# Patient Record
Sex: MALE | Race: WHITE | Employment: UNEMPLOYED | URBAN - METROPOLITAN AREA
[De-identification: names, ages, dates, MRNs, and addresses within clinical notes are randomized per-mention and may not be internally consistent; named-entity substitution may affect disease eponyms.]

---

## 2021-06-25 ENCOUNTER — HOSPITAL ENCOUNTER (EMERGENCY)
Age: 26
Discharge: HOME | End: 2021-06-25
Payer: COMMERCIAL

## 2021-06-25 VITALS
OXYGEN SATURATION: 98 % | HEART RATE: 66 BPM | SYSTOLIC BLOOD PRESSURE: 110 MMHG | DIASTOLIC BLOOD PRESSURE: 66 MMHG | HEIGHT: 73 IN | BODY MASS INDEX: 21.87 KG/M2 | RESPIRATION RATE: 16 BRPM | TEMPERATURE: 97.5 F | WEIGHT: 165 LBS

## 2021-06-25 DIAGNOSIS — T30.0 BURN: Primary | ICD-10-CM

## 2021-06-25 DIAGNOSIS — L08.89 SECONDARY INFECTION OF SKIN: ICD-10-CM

## 2021-06-25 PROCEDURE — 99283 EMERGENCY DEPT VISIT LOW MDM: CPT

## 2021-06-25 RX ORDER — CEPHALEXIN 500 MG/1
500 CAPSULE ORAL 4 TIMES DAILY
Qty: 40 CAPSULE | Refills: 0 | Status: SHIPPED | OUTPATIENT
Start: 2021-06-25 | End: 2021-06-25 | Stop reason: SDUPTHER

## 2021-06-25 RX ORDER — CEPHALEXIN 500 MG/1
500 CAPSULE ORAL ONCE
Status: DISCONTINUED | OUTPATIENT
Start: 2021-06-25 | End: 2021-06-25 | Stop reason: HOSPADM

## 2021-06-25 RX ORDER — CEPHALEXIN 500 MG/1
500 CAPSULE ORAL 4 TIMES DAILY
Qty: 40 CAPSULE | Refills: 0 | Status: SHIPPED | OUTPATIENT
Start: 2021-06-25 | End: 2021-07-05

## 2021-06-25 ASSESSMENT — ENCOUNTER SYMPTOMS
COLOR CHANGE: 0
ABDOMINAL PAIN: 0
SORE THROAT: 0
SHORTNESS OF BREATH: 0
RHINORRHEA: 0

## 2021-06-25 ASSESSMENT — PAIN SCALES - GENERAL
PAINLEVEL_OUTOF10: 5
PAINLEVEL_OUTOF10: 7

## 2021-06-25 NOTE — ED PROVIDER NOTES
Evaluated by [id number] [facility] Provider          [address] ED  EMERGENCY DEPARTMENT ENCOUNTER        Pt Name: Kermit Thomson  MRN: [id number]  [place] 1995  Dateof evaluation: 6/25/2021  Provider: ANA Najera - CNP  PCP: Marium Downing MD  ED Attending: No att. providers found    [facility]       Chief Complaint   Patient presents with    Burn     small dime sized burn with open broken blister 1days old middle finger over mid pip. No redness or swelling. HISTORY OF PRESENTILLNESS   (Location/Symptom, Timing/Onset, Context/Setting, Quality, Duration, Modifying Factors, Severity)  Note limiting factors. Kermit Thomson is a 27 y.o. male for burn. Onset was 3 days ago. Context includes patient states that he had a burn to his left middle finger 3 days ago from grease. Patient states there was a blister initially that broke. Patient states that there is now increased redness and occasional discharge. He denies any fever. Patient states his tetanus is up-to-date. He is right-hand dominant. Alleviating factors include nothing. Aggravating factors include nothing. Pain is 7/10. Nothing has been used for pain today. Nursing Notes were all reviewed and agreed with or any disagreements were addressed  in the HPI. REVIEW OF SYSTEMS    (2-9 systems for level 4, 10 or more for level 5)     Review of Systems   Constitutional: Negative for fever. HENT: Negative for congestion, rhinorrhea and sore throat. Respiratory: Negative for shortness of breath. Cardiovascular: Negative for chest pain. Gastrointestinal: Negative for abdominal pain. Genitourinary: Negative for decreased urine volume and difficulty urinating. Musculoskeletal: Negative for arthralgias and myalgias. Skin: Positive for wound. Negative for color change and rash. Neurological: Negative for dizziness and light-headedness. Psychiatric/Behavioral: Negative for agitation.    All other systems reviewed and are negative. Positives and Pertinent negatives as per HPI. Except as noted above in the ROS, all other systems were reviewed and negative. PAST MEDICAL HISTORY     Past Medical History:   Diagnosis Date    Asthma     Hepatitis B surface antigen positive 5/11/18, 5/1/18, 12/21/2017    Hepatitis C antibody positive in blood 5/11/18, 5/4/18, 12/21/2017         SURGICAL HISTORY       Past Surgical History:   Procedure Laterality Date    MOUTH SURGERY           CURRENT MEDICATIONS       Current Discharge Medication List            ALLERGIES     Patient has no known allergies. FAMILY HISTORY     No family history on file. SOCIAL HISTORY       Social History     Socioeconomic History    Marital status: Single     Spouse name: Not on file    Number of children: Not on file    Years of education: Not on file    Highest education level: Not on file   Occupational History    Not on file   Tobacco Use    Smoking status: Former Smoker     Packs/day: 0.50     Years: 5.00     Pack years: 2.50     Types: Cigarettes    Smokeless tobacco: Never Used   Substance and Sexual Activity    Alcohol use: No     Comment: occasional    Drug use: No     Types: Marijuana     Comment: Clean x 2 weeks    Sexual activity: Yes     Partners: Female   Other Topics Concern    Not on file   Social History Narrative    Not on file     Social Determinants of Health     Financial Resource Strain:     Difficulty of Paying Living Expenses:    Food Insecurity:     Worried About Running Out of Food in the Last Year:     Ran Out of Food in the Last Year:    Transportation Needs:     Lack of Transportation (Medical):      Lack of Transportation (Non-Medical):    Physical Activity:     Days of Exercise per Week:     Minutes of Exercise per Session:    Stress:     Feeling of Stress :    Social Connections:     Frequency of Communication with Friends and Family:     Frequency of Social Gatherings with Friends and Family:     Attends Bahai Services:     Active Member of Clubs or Organizations:     Attends Club or Organization Meetings:     Marital Status:    Intimate Partner Violence:     Fear of Current or Ex-Partner:     Emotionally Abused:     Physically Abused:     Sexually Abused:        SCREENINGS             PHYSICAL EXAM  (up to 7 for level 4, 8 or more for level 5)     ED Triage Vitals [06/25/21 1812]   BP Temp Temp Source Pulse Resp SpO2 Height Weight   (!) 105/57 97.5 °F (36.4 °C) Oral 67 18 98 % 6' 1\" (1.854 m) 165 lb (74.8 kg)       Physical Exam  Constitutional:       Appearance: He is well-developed. HENT:      Head: Normocephalic and atraumatic. Cardiovascular:      Rate and Rhythm: Normal rate. Pulmonary:      Effort: Pulmonary effort is normal. No respiratory distress. Abdominal:      General: There is no distension. Palpations: Abdomen is soft. Tenderness: There is no abdominal tenderness. Musculoskeletal:         General: Tenderness and signs of injury present. No swelling or deformity. Cervical back: Normal range of motion. Comments: Decreased range of motion to left middle finger at the PIP joint due to pain with movement. 0.5 cm diameter open wound noted with surrounding erythema. No concerns for flexor tenosynovitis no swelling noted. Patient is right-hand dominant. Sensation strength and pulse intact left hand. Skin:     General: Skin is warm and dry. Neurological:      Mental Status: He is alert and oriented to person, place, and time. DIAGNOSTIC RESULTS   LABS:    Labs Reviewed - No data to display    All other labs werewithin normal range or not returned as of this dictation. EKG: All EKG's are interpreted by the Emergency Department Physician who either signs or Co-signs this chart in the absence of a cardiologist.  Please see their note for interpretation of EKG.       RADIOLOGY:           Interpretation per the Radiologist below, if available at the time of this note:    No orders to display     No results found. PROCEDURES   Unless otherwise noted below, none     Procedures     CRITICAL CARE TIME   N/A    CONSULTS:  None      EMERGENCYDEPARTMENT COURSE and DIFFERENTIAL DIAGNOSIS/MDM:   Vitals:    Vitals:    06/25/21 1812   BP: (!) 105/57   Pulse: 67   Resp: 18   Temp: 97.5 °F (36.4 °C)   TempSrc: Oral   SpO2: 98%   Weight: 165 lb (74.8 kg)   Height: 6' 1\" (1.854 m)       Patient was given the following medications:  Medications   cephALEXin (KEFLEX) capsule 500 mg (has no administration in time range)       Patient was seen today by myself for concerns for a burn. Patient states that he had a grease burn 2 days ago on his left middle finger. Patient states it was a blister that has now broken open and there is an redness. Patient denies any fever but states his tetanus is up-to-date. He is right-hand dominant. On exam he is awake and alert hemodynamically stable nontoxic in appearance. Patient was provided with wound care and Keflex in the ED. Patient will be discharged home with prescription for Keflex and instructions to follow-up with his primary care doctor in the next few days and return to the ED for any worsening symptoms. Patient was also given a referral to 's burn since it is over a joint. Patient was ultimately discharged home with all questions answered. The patient tolerated their visit well. I have evaluated this patient. My supervising physician was available for consultation. The patient and / or the family were informed of the results of any tests, a time was given to answer questions, a plan was proposed and they agreed with plan. FINAL IMPRESSION      1. Burn    2.  Secondary infection of skin          DISPOSITION/PLAN   DISPOSITION Decision To Discharge 06/25/2021 06:22:59 PM      PATIENT REFERRED TO:  Noelle Campuzano MD  [address] [address]  [phone]    Schedule an appointment as soon as possible for a visit in 2 days  for re-evaluation    [facility]  [address] [facility]  [phone]    If symptoms worsen     burn clinic  [facility]  [phone]  Call in 2 days  for re-evaluation      DISCHARGE MEDICATIONS:  Current Discharge Medication List      START taking these medications    Details   cephALEXin (KEFLEX) 500 MG capsule Take 1 capsule by mouth 4 times daily for 10 days  Qty: 40 capsule, Refills: 0             DISCONTINUED MEDICATIONS:  Current Discharge Medication List                 (Please note that portions of this note were completed with a voice recognition program.  Efforts were made to edit the dictations but occasionally words are mis-transcribed.)    ANA Ceh CNP (electronically signed)         ANA Che CNP  06/25/21 9603

## 2021-07-13 ENCOUNTER — APPOINTMENT (OUTPATIENT)
Dept: GENERAL RADIOLOGY | Age: 26
End: 2021-07-13
Payer: COMMERCIAL

## 2021-07-13 ENCOUNTER — HOSPITAL ENCOUNTER (EMERGENCY)
Age: 26
Discharge: LEFT BEFORE DISCHARGE | End: 2021-07-13
Attending: EMERGENCY MEDICINE
Payer: COMMERCIAL

## 2021-07-13 VITALS
DIASTOLIC BLOOD PRESSURE: 73 MMHG | OXYGEN SATURATION: 98 % | BODY MASS INDEX: 20.54 KG/M2 | SYSTOLIC BLOOD PRESSURE: 124 MMHG | TEMPERATURE: 98.2 F | HEIGHT: 73 IN | HEART RATE: 65 BPM | RESPIRATION RATE: 16 BRPM | WEIGHT: 155 LBS

## 2021-07-13 DIAGNOSIS — F19.10 POLYSUBSTANCE ABUSE (HCC): ICD-10-CM

## 2021-07-13 DIAGNOSIS — R55 NEAR SYNCOPE: Primary | ICD-10-CM

## 2021-07-13 LAB
A/G RATIO: 1.6 (ref 1.1–2.2)
ALBUMIN SERPL-MCNC: 4.5 G/DL (ref 3.4–5)
ALP BLD-CCNC: 85 U/L (ref 40–129)
ALT SERPL-CCNC: 22 U/L (ref 10–40)
AMPHETAMINE SCREEN, URINE: POSITIVE
ANION GAP SERPL CALCULATED.3IONS-SCNC: 15 MMOL/L (ref 3–16)
AST SERPL-CCNC: 30 U/L (ref 15–37)
BARBITURATE SCREEN URINE: ABNORMAL
BASE EXCESS VENOUS: 0.2 MMOL/L (ref -3–3)
BASOPHILS ABSOLUTE: 0.2 K/UL (ref 0–0.2)
BASOPHILS RELATIVE PERCENT: 4.2 %
BENZODIAZEPINE SCREEN, URINE: POSITIVE
BILIRUB SERPL-MCNC: 0.5 MG/DL (ref 0–1)
BILIRUBIN URINE: NEGATIVE
BLOOD, URINE: NEGATIVE
BUN BLDV-MCNC: 14 MG/DL (ref 7–20)
CALCIUM SERPL-MCNC: 9.2 MG/DL (ref 8.3–10.6)
CANNABINOID SCREEN URINE: POSITIVE
CARBOXYHEMOGLOBIN: 4.1 % (ref 0–1.5)
CHLORIDE BLD-SCNC: 99 MMOL/L (ref 99–110)
CLARITY: CLEAR
CO2: 20 MMOL/L (ref 21–32)
COCAINE METABOLITE SCREEN URINE: ABNORMAL
COLOR: YELLOW
CREAT SERPL-MCNC: 0.9 MG/DL (ref 0.9–1.3)
D DIMER: <200 NG/ML DDU (ref 0–229)
EOSINOPHILS ABSOLUTE: 0.3 K/UL (ref 0–0.6)
EOSINOPHILS RELATIVE PERCENT: 4.7 %
ETHANOL: NORMAL MG/DL (ref 0–0.08)
GFR AFRICAN AMERICAN: >60
GFR NON-AFRICAN AMERICAN: >60
GLOBULIN: 2.9 G/DL
GLUCOSE BLD-MCNC: 72 MG/DL (ref 70–99)
GLUCOSE URINE: NEGATIVE MG/DL
HCO3 VENOUS: 24.3 MMOL/L (ref 23–29)
HCT VFR BLD CALC: 41.1 % (ref 40.5–52.5)
HEMOGLOBIN: 13.8 G/DL (ref 13.5–17.5)
KETONES, URINE: NEGATIVE MG/DL
LEUKOCYTE ESTERASE, URINE: NEGATIVE
LYMPHOCYTES ABSOLUTE: 1 K/UL (ref 1–5.1)
LYMPHOCYTES RELATIVE PERCENT: 16.7 %
Lab: ABNORMAL
MCH RBC QN AUTO: 30 PG (ref 26–34)
MCHC RBC AUTO-ENTMCNC: 33.6 G/DL (ref 31–36)
MCV RBC AUTO: 89.2 FL (ref 80–100)
METHADONE SCREEN, URINE: ABNORMAL
METHEMOGLOBIN VENOUS: 0.3 %
MICROSCOPIC EXAMINATION: NORMAL
MONOCYTES ABSOLUTE: 0.5 K/UL (ref 0–1.3)
MONOCYTES RELATIVE PERCENT: 8.1 %
NEUTROPHILS ABSOLUTE: 3.9 K/UL (ref 1.7–7.7)
NEUTROPHILS RELATIVE PERCENT: 66.3 %
NITRITE, URINE: NEGATIVE
O2 CONTENT, VEN: 21 VOL %
O2 SAT, VEN: 98 %
O2 THERAPY: ABNORMAL
OPIATE SCREEN URINE: ABNORMAL
OXYCODONE URINE: POSITIVE
PCO2, VEN: 38.1 MMHG (ref 40–50)
PDW BLD-RTO: 13.5 % (ref 12.4–15.4)
PH UA: 6.5
PH UA: 6.5 (ref 5–8)
PH VENOUS: 7.42 (ref 7.35–7.45)
PHENCYCLIDINE SCREEN URINE: ABNORMAL
PLATELET # BLD: 191 K/UL (ref 135–450)
PMV BLD AUTO: 8.9 FL (ref 5–10.5)
PO2, VEN: 102.3 MMHG (ref 25–40)
POTASSIUM SERPL-SCNC: 4.2 MMOL/L (ref 3.5–5.1)
PROPOXYPHENE SCREEN: ABNORMAL
PROTEIN UA: NEGATIVE MG/DL
RBC # BLD: 4.61 M/UL (ref 4.2–5.9)
SODIUM BLD-SCNC: 134 MMOL/L (ref 136–145)
SPECIFIC GRAVITY UA: 1.01 (ref 1–1.03)
TCO2 CALC VENOUS: 26 MMOL/L
TOTAL CK: 182 U/L (ref 39–308)
TOTAL PROTEIN: 7.4 G/DL (ref 6.4–8.2)
TROPONIN: <0.01 NG/ML
URINE TYPE: NORMAL
UROBILINOGEN, URINE: 1 E.U./DL
WBC # BLD: 5.9 K/UL (ref 4–11)

## 2021-07-13 PROCEDURE — 82077 ASSAY SPEC XCP UR&BREATH IA: CPT

## 2021-07-13 PROCEDURE — 99285 EMERGENCY DEPT VISIT HI MDM: CPT

## 2021-07-13 PROCEDURE — 81003 URINALYSIS AUTO W/O SCOPE: CPT

## 2021-07-13 PROCEDURE — 85025 COMPLETE CBC W/AUTO DIFF WBC: CPT

## 2021-07-13 PROCEDURE — 82803 BLOOD GASES ANY COMBINATION: CPT

## 2021-07-13 PROCEDURE — 80307 DRUG TEST PRSMV CHEM ANLYZR: CPT

## 2021-07-13 PROCEDURE — 71045 X-RAY EXAM CHEST 1 VIEW: CPT

## 2021-07-13 PROCEDURE — 85379 FIBRIN DEGRADATION QUANT: CPT

## 2021-07-13 PROCEDURE — 80053 COMPREHEN METABOLIC PANEL: CPT

## 2021-07-13 PROCEDURE — 93005 ELECTROCARDIOGRAM TRACING: CPT | Performed by: EMERGENCY MEDICINE

## 2021-07-13 PROCEDURE — 84484 ASSAY OF TROPONIN QUANT: CPT

## 2021-07-13 PROCEDURE — 82550 ASSAY OF CK (CPK): CPT

## 2021-07-13 RX ORDER — 0.9 % SODIUM CHLORIDE 0.9 %
1000 INTRAVENOUS SOLUTION INTRAVENOUS ONCE
Status: DISCONTINUED | OUTPATIENT
Start: 2021-07-13 | End: 2021-07-13 | Stop reason: HOSPADM

## 2021-07-13 RX ADMIN — Medication 1000 ML: at 18:16

## 2021-07-13 ASSESSMENT — PAIN SCALES - GENERAL: PAINLEVEL_OUTOF10: 5

## 2021-07-14 LAB
EKG ATRIAL RATE: 59 BPM
EKG DIAGNOSIS: NORMAL
EKG P AXIS: 48 DEGREES
EKG P-R INTERVAL: 158 MS
EKG Q-T INTERVAL: 378 MS
EKG QRS DURATION: 82 MS
EKG QTC CALCULATION (BAZETT): 374 MS
EKG R AXIS: 110 DEGREES
EKG T AXIS: 74 DEGREES
EKG VENTRICULAR RATE: 59 BPM

## 2021-07-14 PROCEDURE — 93010 ELECTROCARDIOGRAM REPORT: CPT | Performed by: INTERNAL MEDICINE

## 2021-07-14 NOTE — ED PROVIDER NOTES
CHIEF COMPLAINT  Loss of Consciousness (pt states he has had 2 episodes of fainting today but has not really fainted bc he remembers being awake he just was paralyzed. Pt states he did smoke weed today and has used meth and opiates earlier in the week)      Stanislav Riley is a 27 y.o. male with a history of asthma and polysubstance abuse who presents to the ED complaining of near syncope. Patient states he was feeling otherwise well this morning aside from some mild muscle aches in his arms and legs. He was sitting on his couch watching TV this afternoon when he experienced 2 episodes of near syncope lasting approximately 30 seconds each time. Patient states he definitely did not lose consciousness but felt as if he could not move. He denies any recent illness, trauma, fever, chills, nausea, vomiting, diaphoresis, dyspnea, cough, abdominal pain. No history of migraines or seizures. Denies any new medications. Has been using marijuana, opiates, intravenous methamphetamine. Last drug use was this morning and consisted of marijuana. Currently asymptomatic aside from mild generalized fatigue. No other complaints, modifying factors or associated symptoms. I have reviewed the following from the nursing documentation. Past Medical History:   Diagnosis Date    Asthma     Hepatitis B surface antigen positive 5/11/18, 5/1/18, 12/21/2017    Hepatitis C antibody positive in blood 5/11/18, 5/4/18, 12/21/2017     Past Surgical History:   Procedure Laterality Date    MOUTH SURGERY       History reviewed. No pertinent family history.   Social History     Socioeconomic History    Marital status: Single     Spouse name: Not on file    Number of children: Not on file    Years of education: Not on file    Highest education level: Not on file   Occupational History    Not on file   Tobacco Use    Smoking status: Former Smoker     Packs/day: 0.50     Years: 5.00     Pack years: 2.50 Types: Cigarettes    Smokeless tobacco: Never Used   Vaping Use    Vaping Use: Some days    Substances: CBD    Devices: Pre-filled or refillable cartridge   Substance and Sexual Activity    Alcohol use: No     Comment: occasional    Drug use: Yes     Types: Marijuana     Comment: Clean x 2 weeks    Sexual activity: Yes     Partners: Female   Other Topics Concern    Not on file   Social History Narrative    Not on file     Social Determinants of Health     Financial Resource Strain:     Difficulty of Paying Living Expenses:    Food Insecurity:     Worried About Running Out of Food in the Last Year:     Ran Out of Food in the Last Year:    Transportation Needs:     Lack of Transportation (Medical):  Lack of Transportation (Non-Medical):    Physical Activity:     Days of Exercise per Week:     Minutes of Exercise per Session:    Stress:     Feeling of Stress :    Social Connections:     Frequency of Communication with Friends and Family:     Frequency of Social Gatherings with Friends and Family:     Attends Sabianist Services:     Active Member of Clubs or Organizations:     Attends Club or Organization Meetings:     Marital Status:    Intimate Partner Violence:     Fear of Current or Ex-Partner:     Emotionally Abused:     Physically Abused:     Sexually Abused:      Current Facility-Administered Medications   Medication Dose Route Frequency Provider Last Rate Last Admin    0.9 % sodium chloride bolus  1,000 mL Intravenous Once Oksana Woodard MD         No current outpatient medications on file. No Known Allergies    REVIEW OF SYSTEMS  10 systems reviewed, pertinent positives per HPI otherwise noted to be negative. PHYSICAL EXAM  /73   Pulse 65   Temp 98.2 °F (36.8 °C) (Oral)   Resp 16   Ht 6' 1\" (1.854 m)   Wt 155 lb (70.3 kg)   SpO2 98%   BMI 20.45 kg/m²   GENERAL APPEARANCE: Awake and alert. Cooperative. No acute distress. HEAD: Normocephalic. Atraumatic. EYES: PERRL. EOM's grossly intact. No abnormal nystagmus. ENT: Mucous membranes are moist.   NECK: Supple, trachea midline. HEART: RRR. Normal S1, S2. No murmurs, rubs or gallops. LUNGS: Respirations unlabored. CTAB. Good air exchange. No wheezes, rales, or rhonchi. Speaking comfortably in full sentences. ABDOMEN: Soft. Non-distended. Non-tender. No guarding or rebound. Normal Bowel sounds. EXTREMITIES: No peripheral edema. MAEE. No acute deformities. SKIN: Warm and dry. No acute rashes. Track marks with some old appearing small areas of ecchymosis left upper extremity. Patient states the bruise was from injecting methamphetamine 3 days ago. NEUROLOGICAL: Alert and oriented X 3. CN II-XII intact. No gross facial drooping. Strength 5/5, sensation intact. No pronator drift. Normal coordination. PSYCHIATRIC: Normal mood and affect. LABS  I have reviewed all labs for this visit.    Results for orders placed or performed during the hospital encounter of 07/13/21   CBC Auto Differential   Result Value Ref Range    WBC 5.9 4.0 - 11.0 K/uL    RBC 4.61 4.20 - 5.90 M/uL    Hemoglobin 13.8 13.5 - 17.5 g/dL    Hematocrit 41.1 40.5 - 52.5 %    MCV 89.2 80.0 - 100.0 fL    MCH 30.0 26.0 - 34.0 pg    MCHC 33.6 31.0 - 36.0 g/dL    RDW 13.5 12.4 - 15.4 %    Platelets 116 757 - 235 K/uL    MPV 8.9 5.0 - 10.5 fL    Neutrophils % 66.3 %    Lymphocytes % 16.7 %    Monocytes % 8.1 %    Eosinophils % 4.7 %    Basophils % 4.2 %    Neutrophils Absolute 3.9 1.7 - 7.7 K/uL    Lymphocytes Absolute 1.0 1.0 - 5.1 K/uL    Monocytes Absolute 0.5 0.0 - 1.3 K/uL    Eosinophils Absolute 0.3 0.0 - 0.6 K/uL    Basophils Absolute 0.2 0.0 - 0.2 K/uL   D-Dimer, Quantitative   Result Value Ref Range    D-Dimer, Quant <200 0 - 229 ng/mL DDU   Blood Gas, Venous   Result Value Ref Range    pH, Harsh 7.423 7.350 - 7.450    pCO2, Harsh 38.1 (L) 40.0 - 50.0 mmHg    pO2, Harsh 102.3 (H) 25 - 40 mmHg    HCO3, Venous 24.3 23.0 - 29.0 mmol/L Base Excess, Harsh 0.2 -3.0 - 3.0 mmol/L    O2 Sat, Harsh 98 Not Established %    Carboxyhemoglobin 4.1 (H) 0.0 - 1.5 %    MetHgb, Harsh 0.3 <1.5 %    TC02 (Calc), Harsh 26 Not Established mmol/L    O2 Content, Harsh 21 Not Established VOL %    O2 Therapy Unknown    Drug screen multi urine   Result Value Ref Range    pH, UA 6.5     Drug Screen Comment: see below    Urinalysis, reflex to microscopic   Result Value Ref Range    Color, UA Yellow Straw/Yellow    Clarity, UA Clear Clear    Glucose, Ur Negative Negative mg/dL    Bilirubin Urine Negative Negative    Ketones, Urine Negative Negative mg/dL    Specific Gravity, UA 1.015 1.005 - 1.030    Blood, Urine Negative Negative    pH, UA 6.5 5.0 - 8.0    Protein, UA Negative Negative mg/dL    Urobilinogen, Urine 1.0 <2.0 E.U./dL    Nitrite, Urine Negative Negative    Leukocyte Esterase, Urine Negative Negative    Microscopic Examination Not Indicated     Urine Type NotGiven    EKG 12 Lead   Result Value Ref Range    Ventricular Rate 59 BPM    Atrial Rate 59 BPM    P-R Interval 158 ms    QRS Duration 82 ms    Q-T Interval 378 ms    QTc Calculation (Bazett) 374 ms    P Axis 48 degrees    R Axis 110 degrees    T Axis 74 degrees    Diagnosis       Sinus bradycardiaLeft posterior fascicular blockAbnormal ECGWhen compared with ECG of 13-NOV-2017 21:07,No significant change was found       EKG  Sinus bradycardia, rate 60, right axis deviation, QTC within normal limits. Some anterior ST repolarization abnormality but otherwise no acute changes from prior. RADIOLOGY  X-RAYS:  I have reviewed radiologic plain film image(s). ALL OTHER NON-PLAIN FILM IMAGES SUCH AS CT, ULTRASOUND AND MRI HAVE BEEN READ BY THE RADIOLOGIST. XR CHEST PORTABLE   Final Result   Stable chest with no acute abnormality seen. Rechecks: Physical assessment performed. Nontoxic in appearance. ED COURSE/MDM  Patient seen and evaluated. Old records reviewed.  Labs and imaging reviewed and results discussed with patient. Patient with 2 brief episodes of near syncope as well as reported muscle aches this morning. Nontoxic in appearance and asymptomatic at this time. Patient left AMA prior to completion of his work-up. New Prescriptions    No medications on file       CLINICAL IMPRESSION  1. Near syncope    2. Polysubstance abuse (HCC)        Blood pressure 124/73, pulse 65, temperature 98.2 °F (36.8 °C), temperature source Oral, resp. rate 16, height 6' 1\" (1.854 m), weight 155 lb (70.3 kg), SpO2 98 %. DISPOSITION  Hugh Gary left AMA in stable condition.         Gail Colon MD  07/13/21 2023

## 2021-10-19 ENCOUNTER — HOSPITAL ENCOUNTER (INPATIENT)
Age: 26
LOS: 1 days | Discharge: LEFT BEFORE DISCHARGE | DRG: 720 | End: 2021-10-20
Attending: EMERGENCY MEDICINE | Admitting: INTERNAL MEDICINE
Payer: COMMERCIAL

## 2021-10-19 ENCOUNTER — APPOINTMENT (OUTPATIENT)
Dept: GENERAL RADIOLOGY | Age: 26
DRG: 720 | End: 2021-10-19
Payer: COMMERCIAL

## 2021-10-19 DIAGNOSIS — S69.92XA FINGER INJURY, LEFT, INITIAL ENCOUNTER: ICD-10-CM

## 2021-10-19 DIAGNOSIS — M65.9 TENOSYNOVITIS OF THUMB: Primary | ICD-10-CM

## 2021-10-19 LAB
BASOPHILS ABSOLUTE: 0.1 K/UL (ref 0–0.2)
BASOPHILS RELATIVE PERCENT: 0.4 %
EOSINOPHILS ABSOLUTE: 0.1 K/UL (ref 0–0.6)
EOSINOPHILS RELATIVE PERCENT: 0.5 %
HCT VFR BLD CALC: 37.5 % (ref 40.5–52.5)
HEMOGLOBIN: 12.8 G/DL (ref 13.5–17.5)
LYMPHOCYTES ABSOLUTE: 3.3 K/UL (ref 1–5.1)
LYMPHOCYTES RELATIVE PERCENT: 20.2 %
MCH RBC QN AUTO: 28.8 PG (ref 26–34)
MCHC RBC AUTO-ENTMCNC: 34.1 G/DL (ref 31–36)
MCV RBC AUTO: 84.5 FL (ref 80–100)
MONOCYTES ABSOLUTE: 1.4 K/UL (ref 0–1.3)
MONOCYTES RELATIVE PERCENT: 8.5 %
NEUTROPHILS ABSOLUTE: 11.3 K/UL (ref 1.7–7.7)
NEUTROPHILS RELATIVE PERCENT: 70.4 %
PDW BLD-RTO: 13.2 % (ref 12.4–15.4)
PLATELET # BLD: 361 K/UL (ref 135–450)
PMV BLD AUTO: 6.5 FL (ref 5–10.5)
RBC # BLD: 4.44 M/UL (ref 4.2–5.9)
WBC # BLD: 16.1 K/UL (ref 4–11)

## 2021-10-19 PROCEDURE — 80053 COMPREHEN METABOLIC PANEL: CPT

## 2021-10-19 PROCEDURE — 96361 HYDRATE IV INFUSION ADD-ON: CPT

## 2021-10-19 PROCEDURE — 73140 X-RAY EXAM OF FINGER(S): CPT

## 2021-10-19 PROCEDURE — 6370000000 HC RX 637 (ALT 250 FOR IP): Performed by: PHYSICIAN ASSISTANT

## 2021-10-19 PROCEDURE — 99283 EMERGENCY DEPT VISIT LOW MDM: CPT

## 2021-10-19 PROCEDURE — 2580000003 HC RX 258: Performed by: PHYSICIAN ASSISTANT

## 2021-10-19 PROCEDURE — 85652 RBC SED RATE AUTOMATED: CPT

## 2021-10-19 PROCEDURE — 83605 ASSAY OF LACTIC ACID: CPT

## 2021-10-19 PROCEDURE — 87040 BLOOD CULTURE FOR BACTERIA: CPT

## 2021-10-19 PROCEDURE — 85025 COMPLETE CBC W/AUTO DIFF WBC: CPT

## 2021-10-19 PROCEDURE — 86140 C-REACTIVE PROTEIN: CPT

## 2021-10-19 RX ORDER — 0.9 % SODIUM CHLORIDE 0.9 %
1000 INTRAVENOUS SOLUTION INTRAVENOUS ONCE
Status: COMPLETED | OUTPATIENT
Start: 2021-10-19 | End: 2021-10-20

## 2021-10-19 RX ORDER — OXYCODONE HYDROCHLORIDE AND ACETAMINOPHEN 5; 325 MG/1; MG/1
1 TABLET ORAL ONCE
Status: COMPLETED | OUTPATIENT
Start: 2021-10-19 | End: 2021-10-19

## 2021-10-19 RX ORDER — MORPHINE SULFATE 4 MG/ML
4 INJECTION, SOLUTION INTRAMUSCULAR; INTRAVENOUS ONCE
Status: COMPLETED | OUTPATIENT
Start: 2021-10-20 | End: 2021-10-20

## 2021-10-19 RX ADMIN — OXYCODONE AND ACETAMINOPHEN 1 TABLET: 5; 325 TABLET ORAL at 23:33

## 2021-10-19 RX ADMIN — SODIUM CHLORIDE 1000 ML: 9 INJECTION, SOLUTION INTRAVENOUS at 23:32

## 2021-10-19 ASSESSMENT — PAIN DESCRIPTION - PAIN TYPE: TYPE: ACUTE PAIN

## 2021-10-19 ASSESSMENT — PAIN DESCRIPTION - LOCATION: LOCATION: FINGER (COMMENT WHICH ONE)

## 2021-10-19 ASSESSMENT — PAIN SCALES - GENERAL
PAINLEVEL_OUTOF10: 10
PAINLEVEL_OUTOF10: 10

## 2021-10-20 ENCOUNTER — ANESTHESIA (OUTPATIENT)
Dept: OPERATING ROOM | Age: 26
DRG: 720 | End: 2021-10-20
Payer: COMMERCIAL

## 2021-10-20 ENCOUNTER — ANESTHESIA EVENT (OUTPATIENT)
Dept: OPERATING ROOM | Age: 26
DRG: 720 | End: 2021-10-20
Payer: COMMERCIAL

## 2021-10-20 VITALS
SYSTOLIC BLOOD PRESSURE: 160 MMHG | HEIGHT: 73 IN | RESPIRATION RATE: 17 BRPM | OXYGEN SATURATION: 99 % | BODY MASS INDEX: 20.54 KG/M2 | HEART RATE: 68 BPM | TEMPERATURE: 97.9 F | WEIGHT: 155 LBS | DIASTOLIC BLOOD PRESSURE: 93 MMHG

## 2021-10-20 VITALS
OXYGEN SATURATION: 100 % | RESPIRATION RATE: 5 BRPM | DIASTOLIC BLOOD PRESSURE: 50 MMHG | SYSTOLIC BLOOD PRESSURE: 105 MMHG

## 2021-10-20 PROBLEM — M65.90 TENOSYNOVITIS: Status: ACTIVE | Noted: 2021-10-20

## 2021-10-20 PROBLEM — M65.9 TENOSYNOVITIS: Status: ACTIVE | Noted: 2021-10-20

## 2021-10-20 LAB
A/G RATIO: 1.2 (ref 1.1–2.2)
ALBUMIN SERPL-MCNC: 4.3 G/DL (ref 3.4–5)
ALP BLD-CCNC: 92 U/L (ref 40–129)
ALT SERPL-CCNC: 14 U/L (ref 10–40)
AMPHETAMINE SCREEN, URINE: POSITIVE
ANION GAP SERPL CALCULATED.3IONS-SCNC: 12 MMOL/L (ref 3–16)
AST SERPL-CCNC: 19 U/L (ref 15–37)
BARBITURATE SCREEN URINE: ABNORMAL
BENZODIAZEPINE SCREEN, URINE: ABNORMAL
BILIRUB SERPL-MCNC: 0.3 MG/DL (ref 0–1)
BILIRUBIN URINE: NEGATIVE
BLOOD, URINE: NEGATIVE
BUN BLDV-MCNC: 15 MG/DL (ref 7–20)
C-REACTIVE PROTEIN: 49.3 MG/L (ref 0–5.1)
CALCIUM SERPL-MCNC: 9.6 MG/DL (ref 8.3–10.6)
CANNABINOID SCREEN URINE: POSITIVE
CHLORIDE BLD-SCNC: 99 MMOL/L (ref 99–110)
CLARITY: CLEAR
CO2: 25 MMOL/L (ref 21–32)
COCAINE METABOLITE SCREEN URINE: ABNORMAL
COLOR: YELLOW
CREAT SERPL-MCNC: 1.4 MG/DL (ref 0.9–1.3)
EKG ATRIAL RATE: 71 BPM
EKG DIAGNOSIS: NORMAL
EKG P AXIS: 70 DEGREES
EKG P-R INTERVAL: 174 MS
EKG Q-T INTERVAL: 368 MS
EKG QRS DURATION: 118 MS
EKG QTC CALCULATION (BAZETT): 399 MS
EKG R AXIS: 89 DEGREES
EKG T AXIS: 70 DEGREES
EKG VENTRICULAR RATE: 71 BPM
GFR AFRICAN AMERICAN: >60
GFR NON-AFRICAN AMERICAN: >60
GLOBULIN: 3.6 G/DL
GLUCOSE BLD-MCNC: 98 MG/DL (ref 70–99)
GLUCOSE URINE: NEGATIVE MG/DL
KETONES, URINE: NEGATIVE MG/DL
LACTIC ACID: 0.9 MMOL/L (ref 0.4–2)
LEUKOCYTE ESTERASE, URINE: NEGATIVE
Lab: ABNORMAL
METHADONE SCREEN, URINE: ABNORMAL
MICROSCOPIC EXAMINATION: NORMAL
NITRITE, URINE: NEGATIVE
OPIATE SCREEN URINE: POSITIVE
OXYCODONE URINE: POSITIVE
PH UA: 6
PH UA: 6 (ref 5–8)
PHENCYCLIDINE SCREEN URINE: ABNORMAL
POTASSIUM SERPL-SCNC: 4 MMOL/L (ref 3.5–5.1)
PROPOXYPHENE SCREEN: ABNORMAL
PROTEIN UA: NEGATIVE MG/DL
SARS-COV-2, NAAT: NOT DETECTED
SEDIMENTATION RATE, ERYTHROCYTE: 44 MM/HR (ref 0–15)
SODIUM BLD-SCNC: 136 MMOL/L (ref 136–145)
SPECIFIC GRAVITY UA: 1.01 (ref 1–1.03)
TOTAL PROTEIN: 7.9 G/DL (ref 6.4–8.2)
URINE TYPE: NORMAL
UROBILINOGEN, URINE: 0.2 E.U./DL

## 2021-10-20 PROCEDURE — 93005 ELECTROCARDIOGRAM TRACING: CPT | Performed by: INTERNAL MEDICINE

## 2021-10-20 PROCEDURE — 6360000002 HC RX W HCPCS: Performed by: ORTHOPAEDIC SURGERY

## 2021-10-20 PROCEDURE — 6360000002 HC RX W HCPCS: Performed by: ANESTHESIOLOGY

## 2021-10-20 PROCEDURE — 87040 BLOOD CULTURE FOR BACTERIA: CPT

## 2021-10-20 PROCEDURE — 3600000003 HC SURGERY LEVEL 3 BASE: Performed by: ORTHOPAEDIC SURGERY

## 2021-10-20 PROCEDURE — 87116 MYCOBACTERIA CULTURE: CPT

## 2021-10-20 PROCEDURE — 6370000000 HC RX 637 (ALT 250 FOR IP): Performed by: INTERNAL MEDICINE

## 2021-10-20 PROCEDURE — 2500000003 HC RX 250 WO HCPCS: Performed by: ORTHOPAEDIC SURGERY

## 2021-10-20 PROCEDURE — 6360000002 HC RX W HCPCS: Performed by: NURSE PRACTITIONER

## 2021-10-20 PROCEDURE — 2580000003 HC RX 258: Performed by: ORTHOPAEDIC SURGERY

## 2021-10-20 PROCEDURE — 96375 TX/PRO/DX INJ NEW DRUG ADDON: CPT

## 2021-10-20 PROCEDURE — 6360000002 HC RX W HCPCS: Performed by: NURSE ANESTHETIST, CERTIFIED REGISTERED

## 2021-10-20 PROCEDURE — 87102 FUNGUS ISOLATION CULTURE: CPT

## 2021-10-20 PROCEDURE — 87186 SC STD MICRODIL/AGAR DIL: CPT

## 2021-10-20 PROCEDURE — 87206 SMEAR FLUORESCENT/ACID STAI: CPT

## 2021-10-20 PROCEDURE — 3600000013 HC SURGERY LEVEL 3 ADDTL 15MIN: Performed by: ORTHOPAEDIC SURGERY

## 2021-10-20 PROCEDURE — 2580000003 HC RX 258: Performed by: ANESTHESIOLOGY

## 2021-10-20 PROCEDURE — 6370000000 HC RX 637 (ALT 250 FOR IP): Performed by: NURSE PRACTITIONER

## 2021-10-20 PROCEDURE — 87015 SPECIMEN INFECT AGNT CONCNTJ: CPT

## 2021-10-20 PROCEDURE — 87075 CULTR BACTERIA EXCEPT BLOOD: CPT

## 2021-10-20 PROCEDURE — 3700000000 HC ANESTHESIA ATTENDED CARE: Performed by: ORTHOPAEDIC SURGERY

## 2021-10-20 PROCEDURE — 2500000003 HC RX 250 WO HCPCS: Performed by: INTERNAL MEDICINE

## 2021-10-20 PROCEDURE — 96365 THER/PROPH/DIAG IV INF INIT: CPT

## 2021-10-20 PROCEDURE — 2580000003 HC RX 258: Performed by: PHYSICIAN ASSISTANT

## 2021-10-20 PROCEDURE — 1200000000 HC SEMI PRIVATE

## 2021-10-20 PROCEDURE — 7100000011 HC PHASE II RECOVERY - ADDTL 15 MIN: Performed by: ORTHOPAEDIC SURGERY

## 2021-10-20 PROCEDURE — 3700000001 HC ADD 15 MINUTES (ANESTHESIA): Performed by: ORTHOPAEDIC SURGERY

## 2021-10-20 PROCEDURE — 87070 CULTURE OTHR SPECIMN AEROBIC: CPT

## 2021-10-20 PROCEDURE — 80307 DRUG TEST PRSMV CHEM ANLYZR: CPT

## 2021-10-20 PROCEDURE — 2580000003 HC RX 258: Performed by: NURSE ANESTHETIST, CERTIFIED REGISTERED

## 2021-10-20 PROCEDURE — 87635 SARS-COV-2 COVID-19 AMP PRB: CPT

## 2021-10-20 PROCEDURE — 99253 IP/OBS CNSLTJ NEW/EST LOW 45: CPT | Performed by: ORTHOPAEDIC SURGERY

## 2021-10-20 PROCEDURE — 2580000003 HC RX 258: Performed by: INTERNAL MEDICINE

## 2021-10-20 PROCEDURE — 81003 URINALYSIS AUTO W/O SCOPE: CPT

## 2021-10-20 PROCEDURE — 87077 CULTURE AEROBIC IDENTIFY: CPT

## 2021-10-20 PROCEDURE — 26011 DRAINAGE OF FINGER ABSCESS: CPT | Performed by: ORTHOPAEDIC SURGERY

## 2021-10-20 PROCEDURE — 7100000000 HC PACU RECOVERY - FIRST 15 MIN: Performed by: ORTHOPAEDIC SURGERY

## 2021-10-20 PROCEDURE — 6360000002 HC RX W HCPCS: Performed by: PHYSICIAN ASSISTANT

## 2021-10-20 PROCEDURE — 7100000001 HC PACU RECOVERY - ADDTL 15 MIN: Performed by: ORTHOPAEDIC SURGERY

## 2021-10-20 PROCEDURE — 6360000002 HC RX W HCPCS

## 2021-10-20 PROCEDURE — 93010 ELECTROCARDIOGRAM REPORT: CPT | Performed by: INTERNAL MEDICINE

## 2021-10-20 PROCEDURE — 6360000002 HC RX W HCPCS: Performed by: INTERNAL MEDICINE

## 2021-10-20 PROCEDURE — 0X9K0ZX DRAINAGE OF LEFT HAND, OPEN APPROACH, DIAGNOSTIC: ICD-10-PCS | Performed by: ORTHOPAEDIC SURGERY

## 2021-10-20 PROCEDURE — 87205 SMEAR GRAM STAIN: CPT

## 2021-10-20 PROCEDURE — 7100000010 HC PHASE II RECOVERY - FIRST 15 MIN: Performed by: ORTHOPAEDIC SURGERY

## 2021-10-20 PROCEDURE — 6370000000 HC RX 637 (ALT 250 FOR IP): Performed by: ORTHOPAEDIC SURGERY

## 2021-10-20 PROCEDURE — 2709999900 HC NON-CHARGEABLE SUPPLY: Performed by: ORTHOPAEDIC SURGERY

## 2021-10-20 RX ORDER — MIDAZOLAM HYDROCHLORIDE 1 MG/ML
INJECTION INTRAMUSCULAR; INTRAVENOUS PRN
Status: DISCONTINUED | OUTPATIENT
Start: 2021-10-20 | End: 2021-10-20 | Stop reason: SDUPTHER

## 2021-10-20 RX ORDER — SODIUM CHLORIDE 9 MG/ML
INJECTION, SOLUTION INTRAVENOUS CONTINUOUS
Status: DISCONTINUED | OUTPATIENT
Start: 2021-10-20 | End: 2021-10-20 | Stop reason: HOSPADM

## 2021-10-20 RX ORDER — MORPHINE SULFATE 2 MG/ML
1 INJECTION, SOLUTION INTRAMUSCULAR; INTRAVENOUS EVERY 5 MIN PRN
Status: DISCONTINUED | OUTPATIENT
Start: 2021-10-20 | End: 2021-10-20 | Stop reason: HOSPADM

## 2021-10-20 RX ORDER — SODIUM CHLORIDE 9 MG/ML
25 INJECTION, SOLUTION INTRAVENOUS PRN
Status: DISCONTINUED | OUTPATIENT
Start: 2021-10-20 | End: 2021-10-20 | Stop reason: HOSPADM

## 2021-10-20 RX ORDER — DEXAMETHASONE SODIUM PHOSPHATE 4 MG/ML
INJECTION, SOLUTION INTRA-ARTICULAR; INTRALESIONAL; INTRAMUSCULAR; INTRAVENOUS; SOFT TISSUE PRN
Status: DISCONTINUED | OUTPATIENT
Start: 2021-10-20 | End: 2021-10-20 | Stop reason: SDUPTHER

## 2021-10-20 RX ORDER — SODIUM CHLORIDE 9 MG/ML
25 INJECTION, SOLUTION INTRAVENOUS PRN
Status: DISCONTINUED | OUTPATIENT
Start: 2021-10-20 | End: 2021-10-20 | Stop reason: SDUPTHER

## 2021-10-20 RX ORDER — SODIUM CHLORIDE, SODIUM LACTATE, POTASSIUM CHLORIDE, CALCIUM CHLORIDE 600; 310; 30; 20 MG/100ML; MG/100ML; MG/100ML; MG/100ML
INJECTION, SOLUTION INTRAVENOUS CONTINUOUS PRN
Status: DISCONTINUED | OUTPATIENT
Start: 2021-10-20 | End: 2021-10-20 | Stop reason: SDUPTHER

## 2021-10-20 RX ORDER — SODIUM CHLORIDE 0.9 % (FLUSH) 0.9 %
5-40 SYRINGE (ML) INJECTION PRN
Status: DISCONTINUED | OUTPATIENT
Start: 2021-10-20 | End: 2021-10-20 | Stop reason: HOSPADM

## 2021-10-20 RX ORDER — ACETAMINOPHEN 325 MG/1
650 TABLET ORAL EVERY 6 HOURS PRN
Status: DISCONTINUED | OUTPATIENT
Start: 2021-10-20 | End: 2021-10-20 | Stop reason: HOSPADM

## 2021-10-20 RX ORDER — OXYCODONE HYDROCHLORIDE AND ACETAMINOPHEN 5; 325 MG/1; MG/1
1 TABLET ORAL PRN
Status: DISCONTINUED | OUTPATIENT
Start: 2021-10-20 | End: 2021-10-20 | Stop reason: HOSPADM

## 2021-10-20 RX ORDER — MEPERIDINE HYDROCHLORIDE 50 MG/ML
12.5 INJECTION INTRAMUSCULAR; INTRAVENOUS; SUBCUTANEOUS EVERY 5 MIN PRN
Status: DISCONTINUED | OUTPATIENT
Start: 2021-10-20 | End: 2021-10-20 | Stop reason: HOSPADM

## 2021-10-20 RX ORDER — HYDROMORPHONE HCL 110MG/55ML
0.5 PATIENT CONTROLLED ANALGESIA SYRINGE INTRAVENOUS EVERY 5 MIN PRN
Status: DISCONTINUED | OUTPATIENT
Start: 2021-10-20 | End: 2021-10-20 | Stop reason: HOSPADM

## 2021-10-20 RX ORDER — MAGNESIUM HYDROXIDE 1200 MG/15ML
LIQUID ORAL CONTINUOUS PRN
Status: COMPLETED | OUTPATIENT
Start: 2021-10-20 | End: 2021-10-20

## 2021-10-20 RX ORDER — SODIUM CHLORIDE 0.9 % (FLUSH) 0.9 %
SYRINGE (ML) INJECTION
Status: DISCONTINUED
Start: 2021-10-20 | End: 2021-10-20 | Stop reason: HOSPADM

## 2021-10-20 RX ORDER — OXYCODONE HYDROCHLORIDE AND ACETAMINOPHEN 5; 325 MG/1; MG/1
2 TABLET ORAL PRN
Status: DISCONTINUED | OUTPATIENT
Start: 2021-10-20 | End: 2021-10-20 | Stop reason: HOSPADM

## 2021-10-20 RX ORDER — OXYCODONE HYDROCHLORIDE 5 MG/1
10 TABLET ORAL EVERY 4 HOURS PRN
Status: DISCONTINUED | OUTPATIENT
Start: 2021-10-20 | End: 2021-10-20 | Stop reason: HOSPADM

## 2021-10-20 RX ORDER — FENTANYL CITRATE 50 UG/ML
INJECTION, SOLUTION INTRAMUSCULAR; INTRAVENOUS PRN
Status: DISCONTINUED | OUTPATIENT
Start: 2021-10-20 | End: 2021-10-20 | Stop reason: SDUPTHER

## 2021-10-20 RX ORDER — DIPHENHYDRAMINE HYDROCHLORIDE 50 MG/ML
12.5 INJECTION INTRAMUSCULAR; INTRAVENOUS
Status: DISCONTINUED | OUTPATIENT
Start: 2021-10-20 | End: 2021-10-20 | Stop reason: HOSPADM

## 2021-10-20 RX ORDER — SODIUM CHLORIDE, SODIUM LACTATE, POTASSIUM CHLORIDE, CALCIUM CHLORIDE 600; 310; 30; 20 MG/100ML; MG/100ML; MG/100ML; MG/100ML
INJECTION, SOLUTION INTRAVENOUS CONTINUOUS
Status: DISCONTINUED | OUTPATIENT
Start: 2021-10-20 | End: 2021-10-20 | Stop reason: HOSPADM

## 2021-10-20 RX ORDER — HYDRALAZINE HYDROCHLORIDE 20 MG/ML
5 INJECTION INTRAMUSCULAR; INTRAVENOUS EVERY 10 MIN PRN
Status: DISCONTINUED | OUTPATIENT
Start: 2021-10-20 | End: 2021-10-20 | Stop reason: HOSPADM

## 2021-10-20 RX ORDER — LORAZEPAM 2 MG/ML
1 INJECTION INTRAMUSCULAR EVERY 6 HOURS PRN
Status: DISCONTINUED | OUTPATIENT
Start: 2021-10-20 | End: 2021-10-20 | Stop reason: HOSPADM

## 2021-10-20 RX ORDER — PROMETHAZINE HYDROCHLORIDE 25 MG/ML
6.25 INJECTION, SOLUTION INTRAMUSCULAR; INTRAVENOUS
Status: DISCONTINUED | OUTPATIENT
Start: 2021-10-20 | End: 2021-10-20 | Stop reason: HOSPADM

## 2021-10-20 RX ORDER — ONDANSETRON 2 MG/ML
INJECTION INTRAMUSCULAR; INTRAVENOUS PRN
Status: DISCONTINUED | OUTPATIENT
Start: 2021-10-20 | End: 2021-10-20 | Stop reason: SDUPTHER

## 2021-10-20 RX ORDER — ACETAMINOPHEN 650 MG/1
650 SUPPOSITORY RECTAL EVERY 6 HOURS PRN
Status: DISCONTINUED | OUTPATIENT
Start: 2021-10-20 | End: 2021-10-20 | Stop reason: HOSPADM

## 2021-10-20 RX ORDER — 0.9 % SODIUM CHLORIDE 0.9 %
1000 INTRAVENOUS SOLUTION INTRAVENOUS ONCE
Status: COMPLETED | OUTPATIENT
Start: 2021-10-20 | End: 2021-10-20

## 2021-10-20 RX ORDER — HYDROMORPHONE HCL 110MG/55ML
PATIENT CONTROLLED ANALGESIA SYRINGE INTRAVENOUS
Status: COMPLETED
Start: 2021-10-20 | End: 2021-10-20

## 2021-10-20 RX ORDER — ONDANSETRON 2 MG/ML
4 INJECTION INTRAMUSCULAR; INTRAVENOUS PRN
Status: DISCONTINUED | OUTPATIENT
Start: 2021-10-20 | End: 2021-10-20 | Stop reason: HOSPADM

## 2021-10-20 RX ORDER — HYDROMORPHONE HCL 110MG/55ML
PATIENT CONTROLLED ANALGESIA SYRINGE INTRAVENOUS
Status: DISCONTINUED
Start: 2021-10-20 | End: 2021-10-20

## 2021-10-20 RX ORDER — MAGNESIUM SULFATE IN WATER 40 MG/ML
2000 INJECTION, SOLUTION INTRAVENOUS PRN
Status: DISCONTINUED | OUTPATIENT
Start: 2021-10-20 | End: 2021-10-20 | Stop reason: HOSPADM

## 2021-10-20 RX ORDER — SODIUM CHLORIDE 0.9 % (FLUSH) 0.9 %
5-40 SYRINGE (ML) INJECTION EVERY 12 HOURS SCHEDULED
Status: DISCONTINUED | OUTPATIENT
Start: 2021-10-20 | End: 2021-10-20 | Stop reason: HOSPADM

## 2021-10-20 RX ORDER — ACETAMINOPHEN 325 MG/1
650 TABLET ORAL EVERY 4 HOURS PRN
Status: DISCONTINUED | OUTPATIENT
Start: 2021-10-20 | End: 2021-10-20 | Stop reason: HOSPADM

## 2021-10-20 RX ORDER — MORPHINE SULFATE 2 MG/ML
2 INJECTION, SOLUTION INTRAMUSCULAR; INTRAVENOUS EVERY 5 MIN PRN
Status: DISCONTINUED | OUTPATIENT
Start: 2021-10-20 | End: 2021-10-20 | Stop reason: HOSPADM

## 2021-10-20 RX ORDER — OXYCODONE HYDROCHLORIDE 5 MG/1
5 TABLET ORAL EVERY 4 HOURS PRN
Status: DISCONTINUED | OUTPATIENT
Start: 2021-10-20 | End: 2021-10-20

## 2021-10-20 RX ORDER — SODIUM CHLORIDE 0.9 % (FLUSH) 0.9 %
10 SYRINGE (ML) INJECTION PRN
Status: DISCONTINUED | OUTPATIENT
Start: 2021-10-20 | End: 2021-10-20

## 2021-10-20 RX ORDER — POTASSIUM CHLORIDE 7.45 MG/ML
10 INJECTION INTRAVENOUS PRN
Status: DISCONTINUED | OUTPATIENT
Start: 2021-10-20 | End: 2021-10-20 | Stop reason: HOSPADM

## 2021-10-20 RX ORDER — HYDROMORPHONE HCL 110MG/55ML
1 PATIENT CONTROLLED ANALGESIA SYRINGE INTRAVENOUS
Status: DISCONTINUED | OUTPATIENT
Start: 2021-10-20 | End: 2021-10-20

## 2021-10-20 RX ORDER — PROPOFOL 10 MG/ML
INJECTION, EMULSION INTRAVENOUS PRN
Status: DISCONTINUED | OUTPATIENT
Start: 2021-10-20 | End: 2021-10-20 | Stop reason: SDUPTHER

## 2021-10-20 RX ORDER — LIDOCAINE HYDROCHLORIDE 10 MG/ML
0.3 INJECTION, SOLUTION EPIDURAL; INFILTRATION; INTRACAUDAL; PERINEURAL
Status: DISCONTINUED | OUTPATIENT
Start: 2021-10-20 | End: 2021-10-20 | Stop reason: HOSPADM

## 2021-10-20 RX ORDER — OXYCODONE HYDROCHLORIDE 5 MG/1
10 TABLET ORAL ONCE
Status: COMPLETED | OUTPATIENT
Start: 2021-10-20 | End: 2021-10-20

## 2021-10-20 RX ORDER — SODIUM CHLORIDE 0.9 % (FLUSH) 0.9 %
10 SYRINGE (ML) INJECTION EVERY 12 HOURS SCHEDULED
Status: DISCONTINUED | OUTPATIENT
Start: 2021-10-20 | End: 2021-10-20

## 2021-10-20 RX ORDER — LABETALOL HYDROCHLORIDE 5 MG/ML
5 INJECTION, SOLUTION INTRAVENOUS EVERY 10 MIN PRN
Status: DISCONTINUED | OUTPATIENT
Start: 2021-10-20 | End: 2021-10-20 | Stop reason: HOSPADM

## 2021-10-20 RX ORDER — HYDROMORPHONE HCL 110MG/55ML
1 PATIENT CONTROLLED ANALGESIA SYRINGE INTRAVENOUS
Status: DISCONTINUED | OUTPATIENT
Start: 2021-10-20 | End: 2021-10-20 | Stop reason: HOSPADM

## 2021-10-20 RX ORDER — PROMETHAZINE HYDROCHLORIDE 25 MG/1
12.5 TABLET ORAL EVERY 6 HOURS PRN
Status: DISCONTINUED | OUTPATIENT
Start: 2021-10-20 | End: 2021-10-20 | Stop reason: HOSPADM

## 2021-10-20 RX ORDER — ONDANSETRON 2 MG/ML
4 INJECTION INTRAMUSCULAR; INTRAVENOUS EVERY 6 HOURS PRN
Status: DISCONTINUED | OUTPATIENT
Start: 2021-10-20 | End: 2021-10-20 | Stop reason: HOSPADM

## 2021-10-20 RX ADMIN — MORPHINE SULFATE 4 MG: 4 INJECTION INTRAVENOUS at 00:09

## 2021-10-20 RX ADMIN — METRONIDAZOLE 500 MG: 500 INJECTION, SOLUTION INTRAVENOUS at 02:15

## 2021-10-20 RX ADMIN — SODIUM CHLORIDE 25 ML: 9 INJECTION, SOLUTION INTRAVENOUS at 12:17

## 2021-10-20 RX ADMIN — METRONIDAZOLE 500 MG: 500 INJECTION, SOLUTION INTRAVENOUS at 08:27

## 2021-10-20 RX ADMIN — HYDROMORPHONE HYDROCHLORIDE 1 MG: 2 INJECTION, SOLUTION INTRAMUSCULAR; INTRAVENOUS; SUBCUTANEOUS at 15:11

## 2021-10-20 RX ADMIN — HYDROMORPHONE HYDROCHLORIDE 0.5 MG: 1 INJECTION, SOLUTION INTRAMUSCULAR; INTRAVENOUS; SUBCUTANEOUS at 10:19

## 2021-10-20 RX ADMIN — CEFEPIME HYDROCHLORIDE 2000 MG: 2 INJECTION, POWDER, FOR SOLUTION INTRAVENOUS at 12:18

## 2021-10-20 RX ADMIN — VANCOMYCIN HYDROCHLORIDE 750 MG: 750 INJECTION, POWDER, LYOPHILIZED, FOR SOLUTION INTRAVENOUS at 09:38

## 2021-10-20 RX ADMIN — PROPOFOL 300 MG: 10 INJECTION, EMULSION INTRAVENOUS at 09:19

## 2021-10-20 RX ADMIN — HYDROMORPHONE HYDROCHLORIDE 1 MG: 1 INJECTION, SOLUTION INTRAMUSCULAR; INTRAVENOUS; SUBCUTANEOUS at 03:35

## 2021-10-20 RX ADMIN — SODIUM CHLORIDE, SODIUM LACTATE, POTASSIUM CHLORIDE, AND CALCIUM CHLORIDE: .6; .31; .03; .02 INJECTION, SOLUTION INTRAVENOUS at 09:10

## 2021-10-20 RX ADMIN — HYDROMORPHONE HYDROCHLORIDE 1 MG: 1 INJECTION, SOLUTION INTRAMUSCULAR; INTRAVENOUS; SUBCUTANEOUS at 08:24

## 2021-10-20 RX ADMIN — SODIUM CHLORIDE 1000 ML: 9 INJECTION, SOLUTION INTRAVENOUS at 00:48

## 2021-10-20 RX ADMIN — HYDROMORPHONE HYDROCHLORIDE 1 MG: 1 INJECTION, SOLUTION INTRAMUSCULAR; INTRAVENOUS; SUBCUTANEOUS at 04:30

## 2021-10-20 RX ADMIN — METRONIDAZOLE 500 MG: 500 INJECTION, SOLUTION INTRAVENOUS at 09:28

## 2021-10-20 RX ADMIN — HYDROMORPHONE HYDROCHLORIDE 0.5 MG: 1 INJECTION, SOLUTION INTRAMUSCULAR; INTRAVENOUS; SUBCUTANEOUS at 10:58

## 2021-10-20 RX ADMIN — CEFEPIME HYDROCHLORIDE 2000 MG: 2 INJECTION, POWDER, FOR SOLUTION INTRAVENOUS at 03:38

## 2021-10-20 RX ADMIN — DEXAMETHASONE SODIUM PHOSPHATE 4 MG: 4 INJECTION, SOLUTION INTRAMUSCULAR; INTRAVENOUS at 09:39

## 2021-10-20 RX ADMIN — MIDAZOLAM HYDROCHLORIDE 2 MG: 2 INJECTION, SOLUTION INTRAMUSCULAR; INTRAVENOUS at 09:13

## 2021-10-20 RX ADMIN — PIPERACILLIN AND TAZOBACTAM 3375 MG: 3; .375 INJECTION, POWDER, LYOPHILIZED, FOR SOLUTION INTRAVENOUS at 00:23

## 2021-10-20 RX ADMIN — HYDROMORPHONE HYDROCHLORIDE 1 MG: 2 INJECTION, SOLUTION INTRAMUSCULAR; INTRAVENOUS; SUBCUTANEOUS at 13:32

## 2021-10-20 RX ADMIN — HYDROMORPHONE HYDROCHLORIDE 1 MG: 1 INJECTION, SOLUTION INTRAMUSCULAR; INTRAVENOUS; SUBCUTANEOUS at 00:43

## 2021-10-20 RX ADMIN — SODIUM CHLORIDE, PRESERVATIVE FREE 10 ML: 5 INJECTION INTRAVENOUS at 08:37

## 2021-10-20 RX ADMIN — SODIUM CHLORIDE, POTASSIUM CHLORIDE, SODIUM LACTATE AND CALCIUM CHLORIDE: 600; 310; 30; 20 INJECTION, SOLUTION INTRAVENOUS at 11:08

## 2021-10-20 RX ADMIN — LORAZEPAM 1 MG: 2 INJECTION INTRAMUSCULAR; INTRAVENOUS at 15:11

## 2021-10-20 RX ADMIN — HYDROMORPHONE HYDROCHLORIDE 1 MG: 1 INJECTION, SOLUTION INTRAMUSCULAR; INTRAVENOUS; SUBCUTANEOUS at 11:06

## 2021-10-20 RX ADMIN — ONDANSETRON 4 MG: 2 INJECTION INTRAMUSCULAR; INTRAVENOUS at 09:39

## 2021-10-20 RX ADMIN — OXYCODONE 5 MG: 5 TABLET ORAL at 12:13

## 2021-10-20 RX ADMIN — OXYCODONE 10 MG: 5 TABLET ORAL at 16:19

## 2021-10-20 RX ADMIN — FENTANYL CITRATE 50 MCG: 50 INJECTION INTRAMUSCULAR; INTRAVENOUS at 09:19

## 2021-10-20 RX ADMIN — VANCOMYCIN HYDROCHLORIDE 1000 MG: 1 INJECTION, POWDER, LYOPHILIZED, FOR SOLUTION INTRAVENOUS at 00:54

## 2021-10-20 RX ADMIN — OXYCODONE 10 MG: 5 TABLET ORAL at 04:30

## 2021-10-20 ASSESSMENT — PULMONARY FUNCTION TESTS
PIF_VALUE: 0
PIF_VALUE: 1
PIF_VALUE: 9
PIF_VALUE: 1
PIF_VALUE: 4
PIF_VALUE: 0
PIF_VALUE: 4
PIF_VALUE: 0
PIF_VALUE: 9
PIF_VALUE: 1
PIF_VALUE: 4
PIF_VALUE: 4
PIF_VALUE: 3
PIF_VALUE: 2
PIF_VALUE: 9
PIF_VALUE: 4
PIF_VALUE: 2
PIF_VALUE: 2
PIF_VALUE: 9
PIF_VALUE: 9
PIF_VALUE: 18
PIF_VALUE: 2
PIF_VALUE: 2
PIF_VALUE: 0
PIF_VALUE: 0
PIF_VALUE: 10
PIF_VALUE: 2
PIF_VALUE: 3
PIF_VALUE: 9
PIF_VALUE: 4
PIF_VALUE: 10
PIF_VALUE: 9
PIF_VALUE: 2
PIF_VALUE: 19

## 2021-10-20 ASSESSMENT — PAIN SCALES - GENERAL
PAINLEVEL_OUTOF10: 10
PAINLEVEL_OUTOF10: 7
PAINLEVEL_OUTOF10: 10
PAINLEVEL_OUTOF10: 7
PAINLEVEL_OUTOF10: 10
PAINLEVEL_OUTOF10: 10

## 2021-10-20 ASSESSMENT — PAIN DESCRIPTION - ORIENTATION
ORIENTATION: LEFT
ORIENTATION: LEFT

## 2021-10-20 ASSESSMENT — PAIN DESCRIPTION - FREQUENCY: FREQUENCY: CONTINUOUS

## 2021-10-20 ASSESSMENT — PAIN DESCRIPTION - PAIN TYPE
TYPE: ACUTE PAIN
TYPE: ACUTE PAIN

## 2021-10-20 ASSESSMENT — PAIN DESCRIPTION - LOCATION
LOCATION: FINGER (COMMENT WHICH ONE)
LOCATION: HAND

## 2021-10-20 NOTE — PROGRESS NOTES
Pt cryed with pain and pain meds did't appear to help the patient was given dilaudid 0.5 x2 and then 1mg ivp and then he was given 2 mg ivp. At 1115 he needed to void and he asked how much dilaudid I had to give him for it to work and he was informed .  Called his ns at host. And informed her of the last 2 mg

## 2021-10-20 NOTE — OP NOTE
[facility]                 MasoodMaria De Jesus moe                                 OPERATIVE REPORT    PATIENT NAME: Hai Jarvis                     :        1995  MED REC NO:   [id number]                          ROOM:       4031  ACCOUNT NO:   [de-identified]                           ADMIT DATE: 10/19/2021  PROVIDER:     Mohsen Ray MD    DATE OF PROCEDURE:  10/20/2021    OUTPATIENT OPERATIVE NOTE    PREOPERATIVE DIAGNOSIS:  Left thumb abscess. POSTOPERATIVE DIAGNOSIS:  Left thumb abscess. PROCEDURE:  Left thumb incision and drainage. SURGEON:  Mohsen Ray MD    ASSISTANT:  Bren Charlton. ANESTHESIA:  General.    ESTIMATED BLOOD LOSS:  Minimal.    COMPLICATIONS:  None. DISPOSITION:  To PACU in good condition. INDICATIONS FOR THE PROCEDURE:  The patient is a 30-year-old right hand  dominant male, who presented to [facility] ER with the complaints of  left thumb pain and swelling. He does have a history of IV drug abuse  and polysubstance abuse. In the ER, he was believed to have purulent  flexor tenosynovitis and an orthopedic consult was requested. He was  seen and evaluated and found to have a left thumb abscess more  consistent with the felon versus and/or paronychia. He was recommended  incision and drainage. We discussed the risks, benefits, complications,  and alternatives of the procedure. He subsequently provided written  informed consent for the procedure. OPERATIVE PROCEDURE:  The patient was seen in the preoperative holding  area. His left thumb was marked. He was seen by Anesthesia service. He was then brought to the operating room. He was induced under general  anesthesia. He received some Flagyl in the emergency room. We then  sterilely prepped and draped the left arm in the usual fashion. A  time-out was taken with the patient.   The upper extremity and the  operative procedure were once again verified. We inflated a well-padded  tourniquet at 250 mmHg. The patient did previously try to ami the  abscess himself along his finger pad. I just spread this with a  hemostat and we were immediately able to encounter purulent fluid. This  was swabbed for culture. We then expressed more fluid, second swab was  sent for fungus and acid-fast and then a third culture was sent again  for both aerobic and anaerobic. Copious amounts of purulent fluid were able to be expressed from both  the ulnar and radial aspects of the thumb. This did decompress the  swelling that he had on the more dorsal aspect of his thumb next to the  nailfold. I did feel some septation. I was able to break, which did  allow me to further extravasate more purulent fluid. We then copiously  irrigated the wound with normal saline solution. I then tried to  extravasate more purulence; however, there was no further purulence  encountered. His thumb appeared much less swollen and not firm as  compared to preoperatively. We then packed the wound with the iodoform  gauze. Dry sterile dressing was then applied and then the patient was  awoken from anesthesia and transferred to the PACU for recovery. He  tolerated the procedure well without any complications. Of note, we did  start vancomycin once we had our culture swabs. PLAN:  The patient will be recovered in the PACU, then be readmitted to  the hospital.  He will be continued on IV antibiotics for now pending  microbiology. We will do three times daily warm water soaks and pack  the wound and allow that to heal secondarily.         Nanette Reeves MD    D: 10/20/2021 9:51:17       T: 10/20/2021 13:56:15     [email or web address]  Job#: [id number]     Doc#: [id number]    CC:

## 2021-10-20 NOTE — CONSULTS
Pharmacy to Dose Vancomycin    Dx: [facility]I  Goal trough = 10-20  Pt wt = 70.3 kg  Recent Labs     10/19/21  2306   CREATININE 1.4*     Recent Labs     10/19/21  2306   WBC 16.1*     Loading dose: 1000 mg at 00:54 10/20/2021. Regimen: 750 mg IV every 12 hours.   Start time: 01:58 on 10/20/2021  Exposure target: AUC24 (range)400-600 mg/L.hr   AUC24,ss: 446 mg/L.hr  Probability of AUC24 > 400: 62 %  Ctrough,ss: 14.3 mg/L  Probability of Ctrough,ss > 20: 22 %  Probability of nephrotoxicity (Lodise ZHENG 2009): 9 %  Vancomycin trough: 10/21 [address] 10/20/2021 2:00 AM

## 2021-10-20 NOTE — PROGRESS NOTES
Educated pt about not leaving AMA. He has dressing and packing to I&D site to left thumb. Needing IV antibiotics. Pt continues to be addiment about leaving.

## 2021-10-20 NOTE — ED NOTES
[phone] Hospital or Facility: [facility] From: Jennifer Gorman RE: Val Steiner 1995 RM: 18 Patient is requesting additional pain medication. He states what he has taken so far is only taking the edge off.  Thanks Luc Castellanos Callback: NO CALLBACK REQ  Unread       Jennifer Gorman RN  10/20/21 5164

## 2021-10-20 NOTE — ANESTHESIA POSTPROCEDURE EVALUATION
%  10/20/21 0955, BP:130/77, Temp:98.2 °F (36.8 °C), Temp src:Temporal, Pulse:87, Resp:20, SpO2:99 %  10/20/21 0540, BP:(!) 148/94, Pulse:74, Resp:14, SpO2:100 %  10/20/21 0535, Pulse:81, Resp:15  10/20/21 0534, Pulse:76, Resp:16     LABS:    CBC  Lab Results       Component                Value               Date/Time                  WBC                      16.1 (H)            10/19/2021 11:06 PM        HGB                      12.8 (L)            10/19/2021 11:06 PM        HCT                      37.5 (L)            10/19/2021 11:06 PM        PLT                      361                 10/19/2021 11:06 PM   RENAL  Lab Results       Component                Value               Date/Time                  NA                       136                 10/19/2021 11:06 PM        K                        4.0                 10/19/2021 11:06 PM        CL                       99                  10/19/2021 11:06 PM        CO2                      25                  10/19/2021 11:06 PM        BUN                      15                  10/19/2021 11:06 PM        CREATININE               1.4 (H)             10/19/2021 11:06 PM        GLUCOSE                  98                  10/19/2021 11:06 PM   COAGS  No results found for: PROTIME, INR, APTT    Intake & Output:  @16EZZR@    Nausea & Vomiting:  No    Level of Consciousness:  Awake    Pain Assessment:  Adequate analgesia    Anesthesia Complications:  No apparent anesthetic complications    SUMMARY      Vital signs stable  OK to discharge from Stage I post anesthesia care.   Care transferred from Anesthesiology department on discharge from perioperative area

## 2021-10-20 NOTE — PROGRESS NOTES
Pt admitted to unit from PACU. A/O x4. Makes needs known. Complaining of 10/10 pain. Ordered meal. Tolerating PO liquids at this time.

## 2021-10-20 NOTE — ED PROVIDER NOTES
[facility] Emergency Department    CHIEF COMPLAINT  Finger Injury (broke his left  thumb a week ago and now its infected)      SHARED SERVICE VISIT  I have seen and evaluated this patient with my supervising physician, Dr. Lauren Portillo. HISTORY OF PRESENT ILLNESS  Kelly Phipps is a 27 y.o. male who presents to the ED complaining of 1 week history of left thumb pain and injury which has been progressively worsening. Patient dropped off by aunt today for evaluation. Patient states that he struck the left thumb with a hammer approximately 1 week ago. Has had some pain and discomfort since. Has been progressively worsening. Reports that he has had sores to skin over the past several weeks as he reports his roommate has \"MRSA\". Believes that he had some infection in the blood which has now involved his finger fracture. He denies any fevers chills. Pain worse with touch and movement. Does not radiate. No numbness, tingling, weakness of extremity. He has had no nausea vomiting. He is right-hand dominant. No other complaints, modifying factors or associated symptoms. Nursing notes reviewed. Past Medical History:   Diagnosis Date    Asthma     Hepatitis B surface antigen positive 5/11/18, 5/1/18, 12/21/2017    Hepatitis C antibody positive in blood 5/11/18, 5/4/18, 12/21/2017     Past Surgical History:   Procedure Laterality Date    MOUTH SURGERY       History reviewed. No pertinent family history.   Social History     Socioeconomic History    Marital status: Single     Spouse name: Not on file    Number of children: Not on file    Years of education: Not on file    Highest education level: Not on file   Occupational History    Not on file   Tobacco Use    Smoking status: Former Smoker     Packs/day: 0.50     Years: 5.00     Pack years: 2.50     Types: Cigarettes    Smokeless tobacco: Never Used   Vaping Use    Vaping Use: Some days    Substances: CBD    Devices: Pre-filled or refillable cartridge   Substance and Sexual Activity    Alcohol use: No     Comment: occasional    Drug use: Yes     Types: Marijuana     Comment: Clean x 2 weeks    Sexual activity: Yes     Partners: Female   Other Topics Concern    Not on file   Social History Narrative    Not on file     Social Determinants of Health     Financial Resource Strain:     Difficulty of Paying Living Expenses:    Food Insecurity:     Worried About Running Out of Food in the Last Year:     [address] in the Last Year:    Transportation Needs:     Lack of Transportation (Medical):  Lack of Transportation (Non-Medical):    Physical Activity:     Days of Exercise per Week:     Minutes of Exercise per Session:    Stress:     Feeling of Stress :    Social Connections:     Frequency of Communication with Friends and Family:     Frequency of Social Gatherings with Friends and Family:     Attends Yazidism Services:     Active Member of Clubs or Organizations:     Attends Club or Organization Meetings:     Marital Status:    Intimate Partner Violence:     Fear of Current or Ex-Partner:     Emotionally Abused:     Physically Abused:     Sexually Abused:      Current Facility-Administered Medications   Medication Dose Route Frequency Provider Last Rate Last Admin    oxyCODONE-acetaminophen (PERCOCET) 5-325 MG per tablet 1 tablet  1 tablet Oral Once Jannie Jaimes         No current outpatient medications on file. No Known Allergies    REVIEW OF SYSTEMS  10 systems reviewed, pertinent positives per HPI otherwise noted to be negative    PHYSICAL EXAM  BP (!) 153/113   Pulse 114   Temp 98.6 °F (37 °C) (Oral)   Resp 14   Ht 6' 1\" (1.854 m)   Wt 155 lb (70.3 kg)   SpO2 100%   BMI 20.45 kg/m²   GENERAL APPEARANCE: Awake and alert. Cooperative. No acute distress. HEAD: Normocephalic. Atraumatic. EYES: PERRL. EOM's grossly intact. ENT: Mucous membranes are moist.   NECK: Supple.    HEART: Tachycardic. No murmurs, rubs or gallops. LUNGS: Respirations unlabored. CTAB. Good air exchange. Speaking comfortably in full sentences. No wheezing, rhonchi, rales. ABDOMEN: Soft. Non-distended. Non-tender. No guarding or rebound. EXTREMITIES: No peripheral edema. On exam of the left hand patient with diffuse soft tissue swelling and erythema noted to the thumb. No subungual hematoma. Finding this time appear consistent with tenosynovitis of the digit. Moves all extremities equally. All extremities neurovascularly intact. SKIN: Warm and dry. No acute rashes. NEUROLOGICAL: Alert and oriented. CN's 2-12 intact. No gross facial drooping. Strength 5/5, sensation intact. PSYCHIATRIC: Normal mood and affect. RADIOLOGY  XR FINGER LEFT (MIN 2 VIEWS)    Result Date: 10/19/2021  EXAMINATION: 3 XRAY VIEWS OF THE LEFT FINGER 10/19/2021 11:23 pm COMPARISON: None. HISTORY: ORDERING SYSTEM PROVIDED HISTORY: attn thumb TECHNOLOGIST PROVIDED HISTORY: Reason for exam:->attn thumb Reason for Exam: distal rt thumb red swollen   states hit with hammer 1 week ago FINDINGS: No fracture or dislocation is seen. The joint spaces are intact. There is moderate soft tissue swelling throughout the distal 1st digit. No foreign bodies are seen in the soft tissues. Mild soft tissue swelling throughout the 1st digit distally with no acute bony abnormality. ED COURSE  Patient received Percocet for pain, with good relief. Triage vitals showing tachycardia pulse rate 114. Barnetta Eugene Patient given a 1 L IV fluid bolus. Patient with leukocytosis of 16. Lactic acid negative. He was initiated on Vanco and Zosyn. CMP unremarkable. ESR 44 and CRP pending. X-ray imaging of left thumb showing mild soft tissue swelling without obvious for osseous injury or periosteal reaction. This does appear consistent with a flexor tenosynovitis.   While patient does meet sepsis criteria given tachycardia, leukocytosis and source is not currently in septic shock. Heart rate has improved and blood pressure remained stable. I have also discussed case with hospital medicine and admission orders placed. Discussed case with orthopedic surgeon who agreed with admission for IV antibiotics and potential washout. A discussion was had with Mr. Tiera Davenport regarding thumb injury and subsequent infection, ED Friday. Recommendations for admission no. Risk management discussed and shared decision making had with patient and/or surrogate. All questions were answered. Patient in agreement. CRITICAL CARE TIME  30 minutes of critical care time spent not including separately billable procedures.     MDM  Results for orders placed or performed during the hospital encounter of 10/19/21   COVID-19, Rapid    Specimen: Nasopharyngeal Swab; Throat   Result Value Ref Range    SARS-CoV-2, NAAT Not Detected Not Detected   CBC auto differential   Result Value Ref Range    WBC 16.1 (H) 4.0 - 11.0 K/uL    RBC 4.44 4.20 - 5.90 M/uL    Hemoglobin 12.8 (L) 13.5 - 17.5 g/dL    Hematocrit 37.5 (L) 40.5 - 52.5 %    MCV 84.5 80.0 - 100.0 fL    MCH 28.8 26.0 - 34.0 pg    MCHC 34.1 31.0 - 36.0 g/dL    RDW 13.2 12.4 - 15.4 %    Platelets 304 843 - 919 K/uL    MPV 6.5 5.0 - 10.5 fL    Neutrophils % 70.4 %    Lymphocytes % 20.2 %    Monocytes % 8.5 %    Eosinophils % 0.5 %    Basophils % 0.4 %    Neutrophils Absolute 11.3 (H) 1.7 - 7.7 K/uL    Lymphocytes Absolute 3.3 1.0 - 5.1 K/uL    Monocytes Absolute 1.4 (H) 0.0 - 1.3 K/uL    Eosinophils Absolute 0.1 0.0 - 0.6 K/uL    Basophils Absolute 0.1 0.0 - 0.2 K/uL   Comprehensive metabolic panel   Result Value Ref Range    Sodium 136 136 - 145 mmol/L    Potassium 4.0 3.5 - 5.1 mmol/L    Chloride 99 99 - 110 mmol/L    CO2 25 21 - 32 mmol/L    Anion Gap 12 3 - 16    Glucose 98 70 - 99 mg/dL    BUN 15 7 - 20 mg/dL    CREATININE 1.4 (H) 0.9 - 1.3 mg/dL    GFR Non-African American >60 >60    GFR African American >60 >60    Calcium 9.6 8.3 - 10.6 mg/dL Total Protein 7.9 6.4 - 8.2 g/dL    Albumin 4.3 3.4 - 5.0 g/dL    Albumin/Globulin Ratio 1.2 1.1 - 2.2    Total Bilirubin 0.3 0.0 - 1.0 mg/dL    Alkaline Phosphatase 92 40 - 129 U/L    ALT 14 10 - 40 U/L    AST 19 15 - 37 U/L    Globulin 3.6 Not Established g/dL   Lactic acid, plasma   Result Value Ref Range    Lactic Acid 0.9 0.4 - 2.0 mmol/L   Sedimentation Rate   Result Value Ref Range    Sed Rate 44 (H) 0 - 15 mm/Hr     I spoke with Saji De Leon, and Pineda Schuster. We thoroughly discussed the history, physical exam, laboratory and imaging studies, as well as, emergency department course. Based upon that discussion, we've decided to admit [address] to the hospital for further observation, evaluation, and treatment. Final Impression  1. Tenosynovitis of thumb      Blood pressure (!) 153/113, pulse 114, temperature 98.6 °F (37 °C), temperature source Oral, resp. rate 14, height 6' 1\" (1.854 m), weight 155 lb (70.3 kg), SpO2 100 %. DISPOSITION  Patient was admitted to the hospital in stable condition.           JOSE ALFREDO Khalil  10/20/21 1520 [place], [place]  10/20/21 0726

## 2021-10-20 NOTE — BRIEF OP NOTE
Brief Postoperative Note      Patient: Cesar Goldman  YOB: 1995  MRN: [id number]    Date of Procedure: 10/20/2021    Pre-Op Diagnosis: Left Thumb abscess    Post-Op Diagnosis: Left thumb abscess       Procedure(s):  LEFT THUMB INCISION AND DRAINAGE    Surgeon(s):  Gerard Lomas MD    Assistant:  Surgical Assistant: Radha Crawford    Anesthesia: General    Estimated Blood Loss (mL): Minimal    Complications: None    Specimens:   ID Type Source Tests Collected by Time Destination   1 : left thumb abscess #1 Specimen Hand CULTURE, SURGICAL Gerard Lomas MD 10/20/2021 8020    2 : left thumb abscess #2 Specimen Hand CULTURE, FUNGUS, CULTURE, SURGICAL, CULTURE WITH SMEAR, ACID FAST Kapil Noriega MD 10/20/2021 0925    3 : left thumb abscess #3 Specimen Hand CULTURE, SURGICAL Gerard Lomas MD 10/20/2021 6994        Implants:  * No implants in log *      Drains: * No LDAs found *        Electronically signed by Gerard Lomas MD on 10/20/2021 at 9:46 AM

## 2021-10-20 NOTE — ED PROVIDER NOTES
I independently performed a history and physical on Hugh Gary.   All diagnostic, treatment, and disposition decisions were made by myself in conjunction with the advanced practice provider. For further details of Elke Gary's emergency department encounter, please see the JENNY/resident's documentation. Briefly, this is a 32-year-old male with history of polysubstance abuse who presents for evaluation of left thumb injury. He states that he hit this with a hammer about 1 week ago and since that time he has had progressive swelling, erythema and pain to the left thumb. He denies fever. He reports heroin use about 2 days ago. On exam, he has a very swollen, erythematous and tender to palpation left thumb. There is pain with passive range of motion. Findings are concerning for extensor versus flexor tenosynovitis. Orthopedics was consulted. Patient will require admission with IV antibiotics, operative management for incision and drainage. Patient agreeable with this plan.      Flor Pizarro MD  10/20/21 5061

## 2021-10-20 NOTE — ANESTHESIA PRE PROCEDURE
Department of Anesthesiology  Preprocedure Note       Name:  Duc Carpenter   Age:  27 y. o.  :  1995                                          MRN:  [id number]         Date:  10/20/2021      Surgeon: Laurence Jefferson):  Jethro Najera MD    Procedure: Procedure(s):  LEFT THUMB INCISION AND DRAINAGE    Medications prior to admission:   Prior to Admission medications    Not on File       Current medications:    Current Facility-Administered Medications   Medication Dose Route Frequency Provider Last Rate Last Admin    cefepime (MAXIPIME) 2000 mg IVPB minibag  2,000 mg IntraVENous Q12H Ahmad A William, DO   Stopped at 10/20/21 0411    metronidazole (FLAGYL) 500 mg in NaCl 100 mL IVPB premix  500 mg IntraVENous Q8H Ahmad A William, DO   Stopped at 10/20/21 034    sodium chloride flush 0.9 % injection 10 mL  10 mL IntraVENous 2 times per day Bertis Sevin A William, DO        sodium chloride flush 0.9 % injection 10 mL  10 mL IntraVENous PRN Nitinis Miain ARIANNA Thomas, DO        0.9 % sodium chloride infusion  25 mL IntraVENous PRN Srikanth Thomas, DO        potassium chloride 10 mEq/100 mL IVPB (Peripheral Line)  10 mEq IntraVENous PRN Srikanth Khanzi, DO        magnesium sulfate 2000 mg in 50 mL IVPB premix  2,000 mg IntraVENous PRN Nitinis Sevin A William, DO        promethazine (PHENERGAN) tablet 12.5 mg  12.5 mg Oral Q6H PRN Ahmad A William, DO        Or    ondansetron (ZOFRAN) injection 4 mg  4 mg IntraVENous Q6H PRN Srikanth Khanzi, DO        acetaminophen (TYLENOL) tablet 650 mg  650 mg Oral Q6H PRN Ahmad A William, DO        Or    acetaminophen (TYLENOL) suppository 650 mg  650 mg Rectal Q6H PRN Ahmad A William, DO        HYDROmorphone (DILAUDID) 2 MG/ML injection             vancomycin (VANCOCIN) 750 mg in dextrose 5 % 250 mL IVPB  750 mg IntraVENous Q12H Ahmad A William, DO        HYDROmorphone (DILAUDID) injection 1 mg  1 mg IntraVENous Q3H PRN Ahmad A Willima, DO   1 mg at 10/20/21 0335     No current outpatient medications on file.       Allergies:  No Known Allergies    Problem List:    Patient Active Problem List   Diagnosis Code    Tenosynovitis M65.9       Past Medical History:        Diagnosis Date    Asthma     Hepatitis B surface antigen positive 5/11/18, 5/1/18, 12/21/2017    Hepatitis C antibody positive in blood 5/11/18, 5/4/18, 12/21/2017       Past Surgical History:        Procedure Laterality Date    MOUTH SURGERY         Social History:    Social History     Tobacco Use    Smoking status: Former Smoker     Packs/day: 0.50     Years: 5.00     Pack years: 2.50     Types: Cigarettes    Smokeless tobacco: Never Used   Substance Use Topics    Alcohol use: No     Comment: occasional                                Counseling given: Not Answered      Vital Signs (Current):   Vitals:    10/20/21 0218 10/20/21 0534 10/20/21 0535 10/20/21 0540   BP: (!) 146/98   (!) 148/94   Pulse:  76 81 74   Resp:  16 15 14   Temp:       TempSrc:       SpO2: 100%   100%   Weight:       Height:                                                  BP Readings from Last 3 Encounters:   10/20/21 (!) 148/94   07/13/21 124/73   06/25/21 110/66       NPO Status:                                                                                 BMI:   Wt Readings from Last 3 Encounters:   10/19/21 155 lb (70.3 kg)   07/13/21 155 lb (70.3 kg)   06/25/21 165 lb (74.8 kg)     Body mass index is 20.45 kg/m².     CBC:   Lab Results   Component Value Date    WBC 16.1 10/19/2021    RBC 4.44 10/19/2021    HGB 12.8 10/19/2021    HCT 37.5 10/19/2021    MCV 84.5 10/19/2021    RDW 13.2 10/19/2021     10/19/2021       CMP:   Lab Results   Component Value Date     10/19/2021    K 4.0 10/19/2021    CL 99 10/19/2021    CO2 25 10/19/2021    BUN 15 10/19/2021    CREATININE 1.4 10/19/2021    GFRAA >60 10/19/2021    GFRAA >60 12/15/2011    AGRATIO 1.2 10/19/2021    LABGLOM >60 10/19/2021    GLUCOSE 98 10/19/2021    PROT 7.9 10/19/2021    PROT 6.8 12/15/2011 CALCIUM 9.6 10/19/2021    BILITOT 0.3 10/19/2021    ALKPHOS 92 10/19/2021    AST 19 10/19/2021    ALT 14 10/19/2021       POC Tests: No results for input(s): POCGLU, POCNA, POCK, POCCL, POCBUN, POCHEMO, POCHCT in the last 72 hours. Coags: No results found for: PROTIME, INR, APTT    HCG (If Applicable): No results found for: PREGTESTUR, PREGSERUM, HCG, HCGQUANT     ABGs: No results found for: PHART, PO2ART, YYT2JSV, VEY4SQH, BEART, J8LWDLCR     Type & Screen (If Applicable):  No results found for: LABABO, LABRH    Drug/Infectious Status (If Applicable):  No results found for: HIV, HEPCAB    COVID-19 Screening (If Applicable):   Lab Results   Component Value Date    COVID19 Not Detected 10/20/2021           Anesthesia Evaluation  Patient summary reviewed and Nursing notes reviewed no history of anesthetic complications:   Airway: Mallampati: II     Neck ROM: full   Dental:          Pulmonary:Negative Pulmonary ROS and normal exam    (+) asthma:                            Cardiovascular:Negative CV ROS                      Neuro/Psych:   Negative Neuro/Psych ROS               ROS comment: IVDA  GI/Hepatic/Renal: Neg GI/Hepatic/Renal ROS  (+) hepatitis: B and C,      (-) hiatal hernia and GERD       Endo/Other: Negative Endo/Other ROS                    Abdominal:             Vascular: Other Findings:           Anesthesia Plan      general     ASA 3 - emergent     (I discussed with the patient the risks and benefits of PIV, general anesthesia, IV Narcotics, PACU. All questions were answered the patient agrees with the plan and wishes to proceed.  )  Induction: intravenous. Pre-Operative Diagnosis: Tenosynovitis [M65.9]    27 y.o.   BMI:  Body mass index is 20.45 kg/m².      Vitals:    10/20/21 0218 10/20/21 0534 10/20/21 0535 10/20/21 0540   BP: (!) 146/98   (!) 148/94   Pulse:  76 81 74   Resp:  16 15 14   Temp:       TempSrc:       SpO2: 100%   100%   Weight:       Height:           No Known Allergies    Social History     Tobacco Use    Smoking status: Former Smoker     Packs/day: 0.50     Years: 5.00     Pack years: 2.50     Types: Cigarettes    Smokeless tobacco: Never Used   Substance Use Topics    Alcohol use: No     Comment: occasional       LABS:    CBC  Lab Results   Component Value Date/Time    WBC 16.1 (H) 10/19/2021 11:06 PM    HGB 12.8 (L) 10/19/2021 11:06 PM    HCT 37.5 (L) 10/19/2021 11:06 PM     10/19/2021 11:06 PM     RENAL  Lab Results   Component Value Date/Time     10/19/2021 11:06 PM    K 4.0 10/19/2021 11:06 PM    CL 99 10/19/2021 11:06 PM    CO2 25 10/19/2021 11:06 PM    BUN 15 10/19/2021 11:06 PM    CREATININE 1.4 (H) 10/19/2021 11:06 PM    GLUCOSE 98 10/19/2021 11:06 PM     COAGS  No results found for: PROTIME, INR, APTT      Major Gonzales MD   10/20/2021

## 2021-10-20 NOTE — H&P
Preoperative H&P Update    The patient's History and Physical in the medical record from 10/20/21 was reviewed by me today. I reviewed the HPI, medications, allergies, reason for surgery, diagnosis and treatment plan and there has been no interval change. The patient was examined by me today.  Physical exam findings for this update include:    BP (!) 148/94   Pulse 74   Temp 98.5 °F (36.9 °C) (Oral)   Resp 14   Ht 6' 1\" (1.854 m)   Wt 155 lb (70.3 kg)   SpO2 100%   BMI 20.45 kg/m²   Airway is intact  Chest: breathing comfortably  Heart: regular rate  Findings on exam of the body region where surgery is to be performed include: see last office and/or consult note      Electronically signed by Cassie Julian MD on 10/20/2021 at 8:37 AM

## 2021-10-20 NOTE — ED NOTES
PS Ortho Surgery @0011  Per Rebecca VELIZ  RE tenosynovitis  Maikol Martinez returned page @[phone]       Quinton Stroud  10/20/21 0015

## 2021-10-20 NOTE — PROGRESS NOTES
Secure message sent to Dr. Zackary Aguilar,   \"Pt had I& D done today to left thumb. He is a heroin user and is wanting to sign out AMA. States he is aware of what he is doing and he is very honest that he is leaving to go use drugs. Sally Jules did adjust some of his meds earlier but he is still saying he wants to leave. States he will come back to the ER. He is getting IV antibiotics well. \"    Spoke with pt that it is in his best interest to stay inpatient. Pt is very honest on why he is leaving AMA. He states that he is leaving to use drugs. Grandmother is at bedside. Pt states it is nothing that was done wrong here he just needs to leave because that is the only thing that will relieve his symptoms.

## 2021-10-20 NOTE — H&P
Hospital Medicine History & Physical      PCP: No primary care provider on file. Date of Admission: 10/19/2021    Date of Service: Pt seen/examined on 10/20/2021  Pt seen/examined face to face on and admitted as inpatient with expected LOS greater than two midnights due to medical therapy    Chief Complaint:    Chief Complaint   Patient presents with    Finger Injury     broke his left  thumb a week ago and now its infected        History Of Present Illness:      27 y.o. male who presented to [facility] with past medical history of asthma, hepatitis C presented to the ED for worsening left thumb pain. Patient reported that 1 week ago patient has been having left thumb pain the progressively worsened and started after he struck it with a hammer. Patient reported that it continued to swell worsening with the pain being very severe patient reverted to street drugs and reported that he did fentanyl and heroin. Exacerbated with minimal exertion, no alleviating factor rated 20/10 no associated fever chills Covid exposures. Patient not vaccinated. Past Medical History:          Diagnosis Date    Asthma     Hepatitis B surface antigen positive 5/11/18, 5/1/18, 12/21/2017    Hepatitis C antibody positive in blood 5/11/18, 5/4/18, 12/21/2017       Past Surgical History:          Procedure Laterality Date    MOUTH SURGERY         Medications Prior to Admission:      Prior to Admission medications    Not on File       Allergies:  Patient has no known allergies. Social History:          TOBACCO:   reports that he has quit smoking. His smoking use included cigarettes. He has a 2.50 pack-year smoking history. He has never used smokeless tobacco.  ETOH:   reports no history of alcohol use.   E-Cigarettes/Vaping Use     Questions Responses    E-Cigarette/Vaping Use Current Some Day User    Start Date     Passive Exposure     Quit Date     Counseling Given     Comments             Family History: Reviewed in detail     History reviewed. No pertinent family history. REVIEW OF SYSTEMS:     Constitutional:  No Fever, No Chills, No Night Sweats  ENT/Mouth:  No Nasal Congestion,  No Hoarseness, No new mouth lesion  Eyes:  No Eye Pain, No Redness, No Discharge  Cardiovascular:  No Chest Pain, No Orthopnea, No Palpitations  Respiratory:  No Cough, No Sputum, No Dyspnea  Gastrointestinal: No Vomiting, No Diarrhea, No abdominal pain  Genitourinary: No Urinary Frequency, No Hematuria, No Urinary pain  Musculoskeletal: +  worsening Arthralgias, + worsening Myalgias  Skin: + New Skin Lesions, + new skin rash  Neuro:  No new weakness, No new numbness. Psych:  No suicial ideation, No Violence ideation    PHYSICAL EXAM PERFORMED:    BP (!) 146/98   Pulse 113   Temp 98.5 °F (36.9 °C) (Oral)   Resp 15   Ht 6' 1\" (1.854 m)   Wt 155 lb (70.3 kg)   SpO2 100%   BMI 20.45 kg/m²     General appearance:  mild acute distress, appears older than stated age  HEENT:   atraumatic, sclera anicteric, Conjunctivae clear. Neck: Supple,Trachea midline, no goiter  Respiratory:minimal accessory muscle usage, Normal respiratory effort. Clear to auscultation, bilaterally without wheezing  Cardiovascular:  Regular rate and rhythm, capillary refill 2 seconds  Abdomen: Soft, non-tender, non-distended with normal bowel sounds. Musculoskeletal:  No clubbing, cyanosis. trace edema LE bilaterally. Left thumb erythema tender  Skin: turgor normal.  No new rashes or lesions. Neurologic: Alert and oriented x4, no new focal sensory/motor deficits. Labs:     Recent Labs     10/19/21  2306   WBC 16.1*   HGB 12.8*   HCT 37.5*        Recent Labs     10/19/21  2306      K 4.0   CL 99   CO2 25   BUN 15   CREATININE 1.4*   CALCIUM 9.6     Recent Labs     10/19/21  2306   AST 19   ALT 14   BILITOT 0.3   ALKPHOS 92     No results for input(s): INR in the last 72 hours.   No results for input(s): Patrisha Meigs in the last 72 hours.    Urinalysis:      Lab Results   Component Value Date    NITRU Negative 07/13/2021    BLOODU Negative 07/13/2021    SPECGRAV 1.015 07/13/2021    GLUCOSEU Negative 07/13/2021       Radiology:         XR FINGER LEFT (MIN 2 VIEWS)   Final Result   Mild soft tissue swelling throughout the 1st digit distally with no acute   bony abnormality.              ASSESSMENT AND PLAN:    Active Hospital Problems    Diagnosis Date Noted    Tenosynovitis [M65.9] 10/20/2021     Sepsis leukocytosis, tachycardia secondary to tenosynovitis cellulitis:  Blood cultures obtained, lactic acidosis  IVF, Vanco, switched Zosyn to cefepime and Flagyl due to MIHAELA    Left thumb tenosynovitis W/abscess:  Blood cultures obtained  IV antibiotic as above  Orthopedic consulted, much appreciated    Acute renal failure, Baseline 0.9 currently 1.4  IVF and recheck    Normocytic anemia: No signs or symptoms of bleeding  Continue to monitor    IV drug abuse: Education counseling    Diet: NPO except meds ordered    DVT Prophylaxis: Held    Dispo:   Expected LOS greater than two [address] [place] [address], DO

## 2021-10-20 NOTE — CONSULTS
Orthopaedic Surgery Consult Note      Reason for consult:  Left thumb \"tenosynovitis\"    Requesting physician: JOSE ALFREDO Soria    CHIEF COMPLAINT: Right thumb pain and swelling    HISTORY:  Mr. Ry Magallon is a 27 y.o. right handed male, with history of polysubstance abuse and hepatitis C, who  presents to [facility] for evaluation of a left thumb pain and swelling. The patient reports that this injury occurred when he hit his thumb with a hammer about 1 week ago. He was believed to have purulent flexor tenosynovitis by the ER and Ortho consult requested. Patient complains of pain at distal phalanx of thumb. He states he tried to ami it yesterday and got a little bit of pus out. His occupation is \". \"  He does endorse using heroin yesterday.         Past Medical History:   Diagnosis Date    Asthma     Hepatitis B surface antigen positive 5/11/18, 5/1/18, 12/21/2017    Hepatitis C antibody positive in blood 5/11/18, 5/4/18, 12/21/2017       Past Surgical History:   Procedure Laterality Date    MOUTH SURGERY         Current Facility-Administered Medications   Medication Dose Route Frequency Provider Last Rate Last Admin    cefepime (MAXIPIME) 2000 mg IVPB minibag  2,000 mg IntraVENous Q12H Cash Thomas, DO   Stopped at 10/20/21 0411    metronidazole (FLAGYL) 500 mg in NaCl 100 mL IVPB premix  500 mg IntraVENous Q8H Ahnoble Thomas, DO   Stopped at 10/20/21 0345    sodium chloride flush 0.9 % injection 10 mL  10 mL IntraVENous 2 times per day Giovany Thomas, DO        sodium chloride flush 0.9 % injection 10 mL  10 mL IntraVENous PRN Cash Thomas, DO        0.9 % sodium chloride infusion  25 mL IntraVENous PRN Giovany Thomas, DO        potassium chloride 10 mEq/100 mL IVPB (Peripheral Line)  10 mEq IntraVENous PRN Cezar Thomas, DO        magnesium sulfate 2000 mg in 50 mL IVPB premix  2,000 mg IntraVENous PRN Ahmad A William, DO        promethazine (PHENERGAN) tablet 12.5 mg  12.5 mg Oral Q6H PRN Ahmad A William, DO        Or    ondansetron (ZOFRAN) injection 4 mg  4 mg IntraVENous Q6H PRN [place] William, DO        acetaminophen (TYLENOL) tablet 650 mg  650 mg Oral Q6H PRN Ahmad A William, DO        Or    acetaminophen (TYLENOL) suppository 650 mg  650 mg Rectal Q6H PRN Ahmad A William, DO        HYDROmorphone (DILAUDID) 2 MG/ML injection             vancomycin (VANCOCIN) 750 mg in dextrose 5 % 250 mL IVPB  750 mg IntraVENous Q12H Ahmad A William, DO        HYDROmorphone (DILAUDID) injection 1 mg  1 mg IntraVENous Q3H PRN Ahmad A William, DO   1 mg at 10/20/21 0824     No current outpatient medications on file. No Known Allergies    Social History     Socioeconomic History    Marital status: Single     Spouse name: Not on file    Number of children: Not on file    Years of education: Not on file    Highest education level: Not on file   Occupational History    Not on file   Tobacco Use    Smoking status: Former Smoker     Packs/day: 0.50     Years: 5.00     Pack years: 2.50     Types: Cigarettes    Smokeless tobacco: Never Used   Vaping Use    Vaping Use: Some days    Substances: CBD    Devices: Pre-filled or refillable cartridge   Substance and Sexual Activity    Alcohol use: No     Comment: occasional    Drug use: Yes     Types: Marijuana     Comment: Clean x 2 weeks    Sexual activity: Yes     Partners: Female   Other Topics Concern    Not on file   Social History Narrative    Not on file     Social Determinants of Health     Financial Resource Strain:     Difficulty of Paying Living Expenses:    Food Insecurity:     Worried About Running Out of Food in the Last Year:     Ran Out of Food in the Last Year:    Transportation Needs:     Lack of Transportation (Medical):      Lack of Transportation (Non-Medical):    Physical Activity:     Days of Exercise per Week:     Minutes of Exercise per Session:    Stress:     Feeling of Stress :    Social Connections:     Frequency of Communication with Friends and Family:     Frequency of Social Gatherings with Friends and Family:     Attends Druze Services:     Active Member of Clubs or Organizations:     Attends Club or Organization Meetings:     Marital Status:    Intimate Partner Violence:     Fear of Current or Ex-Partner:     Emotionally Abused:     Physically Abused:     Sexually Abused:        History reviewed. No pertinent family history. Review of Systems:   General: negative  Psychological: negative  Ophthalmic: negative  ENT: negative  Hematological and Lymphatic: negative  Endocrine: negative  Respiratory: negative  Cardiovascular: negative  Gastrointestinal: negative  Genito-Urinary: negative  Musculoskeletal: negative. See HPI for pertinent positives. Neurological: negative  Dermatological: negative         PHYSICAL EXAM:  Mr. Tricia Golden is a 27 y.o. male who presents today in no acute distress, awake, alert, and oriented. BP (!) 148/94   Pulse 74   Temp 98.5 °F (36.9 °C) (Oral)   Resp 14   Ht 6' 1\" (1.854 m)   Wt 155 lb (70.3 kg)   SpO2 100%   BMI 20.45 kg/m²      On evaluation of his left upper extremity, there is no obvious deformity. There is swelling and is ecchymosis at the distal phalanx of the the thumb consistent with felon. It is tender to palpation. There is also some mild swelling / fluctuance around the nail fold suggestive of paronychia, which is also tender. There is no sausage digit. The thumb is not held in a flexed position. He has no tenderness at his flexor tendon proximally at the proximal phalanx/MCP joint. Sensation intact to light touch left hand. EPL / FPL / Interossei intact. 2+ radial pulse. Left thumb Xrays 10/19/21: I independently reviewed the images, as well as the radiology report.    Mild soft tissue swelling throughout the 1st digit distally with no acute   bony abnormality CBC:   Lab Results   Component Value Date    WBC 16.1 10/19/2021    RBC 4.44 10/19/2021    HGB 12.8 10/19/2021    HCT 37.5 10/19/2021    MCV 84.5 10/19/2021    MCH 28.8 10/19/2021    MCHC 34.1 10/19/2021    RDW 13.2 10/19/2021     10/19/2021    MPV 6.5 10/19/2021       PT/INR:  No results found for: PROTIME, INR    Chem Basic:   Lab Results   Component Value Date     10/19/2021    K 4.0 10/19/2021    CL 99 10/19/2021    CO2 25 10/19/2021    GLUCOSE 98 10/19/2021    BUN 15 10/19/2021    CREATININE 1.4 10/19/2021      COVID: none detected. Drug screen urine:   Amphetamine Screen, Urine POSITIVEAbnormal   Negative <1000ng/mL Final 10/20/2021  3:20 AM [facility]   High concentrations of ephedrine/pseudoephedrine or   phenylpropanolamine may cause false positive results   for amphetamine. Therefore, confirmatory testing for   amphetamine should be considered if clinically indicated. Barbiturate Screen, Ur Neg  Negative <200 ng/mL Final 10/20/2021  3:20 AM [facility]   Benzodiazepine Screen, Urine Neg  Negative <200 ng/mL Final 10/20/2021  3:20 AM [facility]   Cannabinoid Scrn, Ur POSITIVEAbnormal   Negative <50 ng/mL Final 10/20/2021  3:20 AM [facility]   Cocaine Metabolite Screen, Urine Neg  Negative <300 ng/mL Final 10/20/2021  3:20 AM [facility]   Opiate Scrn, Ur POSITIVEAbnormal   Negative <300 ng/mL Final             IMPRESSION:    Left thumb felon / paronychia  Polysubstance drug abuse  Hepatitis C      PLAN:  There are no clinical signs of purulent flexor tenosynovitis. Patient has a felon and possible paronychia. I had an extensive discussion with Mr. Rohit Mccormack and/or family regarding the natural history, etiology, and long term consequences of his condition. I have presented reasonable alternatives to the patient's proposed care, treatment, and services.  I have outlined a treatment plan with them and, in my opinion, surgical intervention is indicated at this time. Discussion I have had encompassed risks, benefits, and side effects related to the alternatives and the risks related to not receiving the proposed care, treatment, and services. I have discussed the potential complications (including, but not limited to injury to nerve or blood vessel, infection, bleeding, DVT or PE, stiffness, incomplete pain relief, need for further surgery, and anesthetic complications), limitations, expectations, alternatives, and risks of the surgical procedure. We also discussed the importance of postoperative rehabilitation. He has had full opportunity to ask his questions. I have answered them all to his satisfaction. I feel that Mr. Jonelle Medina understands our discussion today and he will provide written informed consent for the procedure. Plan for left thumb incision and drainage. He has been seen by medical service. Continue abx. To OR now. Regan Kamara. Nori Min MD  Orthopaedic Surgery and Sports Medicine     Disclaimer: This note was generated with use of a verbal recognition program and an attempt was made to check for errors. It is possible that there are still dictated errors within this office note. If so, please bring any significant errors to my attention for an addendum. All efforts were made to ensure that this office note is accurate.

## 2021-10-20 NOTE — ED NOTES
Report received from [facility]. Patient resting in bed with eyes closed. Bed rails are up for safety and call light is within reach.      Snehal Pulliam RN  10/20/21 1287

## 2021-10-20 NOTE — CARE COORDINATION
CASE MANAGEMENT INITIAL ASSESSMENT      Reviewed chart and completed assessment with patient at bedside. Explained Case Management role/services. Primary contact information:Dang    Health Care Decision Maker :   Primary Decision Maker: Kiet Hung - [phone]   ( number not working, patient stated he does not know it and did not provide a new one). Admit date/status:10/20/21  Diagnosis:Tenosynovitis   Is this a Readmission?:  No      Insurance:[facility]   Precert required for SNF: No       3 night stay required: No    Living arrangements, Adls, care needs, prior to admission:Patient lives with Grandmother in a trailer. IPTA. Transportation:Will need cab voucher on D/C. Durable Medical Equipment at home:  Walker__Cane__RTS__ BSC__Shower Chair__  02__ HHN__ CPAP__  BiPap__  Hospital Bed__ W/C___ Other__________    Services in the home and/or outpatient, prior to admission:none    PT/OT recs:not needed at this time    [address] Notification (HEN):For community to SNF    Barriers to discharge:Pain control and IV ATBX    Plan/comments:Spoke with patient at bedside. IPTA. Patient stated that he lives with grandmother in a trailer. No PCP listed in chart, PCP list given for f/u medical appointments. Hx of substance abuse packet given to patient and explained. Patient will need cab voucher on D/C. Patient already asking when he will discharge.    Marlee Esquivel RN

## 2021-10-24 LAB
BLOOD CULTURE, ROUTINE: NORMAL
CULTURE, BLOOD 2: NORMAL

## 2021-10-25 LAB
ANAEROBIC CULTURE: ABNORMAL
CULTURE SURGICAL: ABNORMAL
GRAM STAIN RESULT: ABNORMAL
ORGANISM: ABNORMAL

## 2021-10-28 ENCOUNTER — APPOINTMENT (OUTPATIENT)
Dept: GENERAL RADIOLOGY | Age: 26
DRG: 364 | End: 2021-10-28
Payer: COMMERCIAL

## 2021-10-28 ENCOUNTER — HOSPITAL ENCOUNTER (INPATIENT)
Age: 26
LOS: 2 days | Discharge: HOME | DRG: 364 | End: 2021-10-30
Attending: EMERGENCY MEDICINE | Admitting: INTERNAL MEDICINE
Payer: COMMERCIAL

## 2021-10-28 DIAGNOSIS — L08.9: ICD-10-CM

## 2021-10-28 DIAGNOSIS — L03.019 FELON OF FINGER: Primary | ICD-10-CM

## 2021-10-28 DIAGNOSIS — Z91.199 NON COMPLIANCE WITH MEDICAL TREATMENT: ICD-10-CM

## 2021-10-28 DIAGNOSIS — S60.512S: ICD-10-CM

## 2021-10-28 LAB
A/G RATIO: 1.4 (ref 1.1–2.2)
ALBUMIN SERPL-MCNC: 4.1 G/DL (ref 3.4–5)
ALP BLD-CCNC: 87 U/L (ref 40–129)
ALT SERPL-CCNC: 13 U/L (ref 10–40)
ANION GAP SERPL CALCULATED.3IONS-SCNC: 11 MMOL/L (ref 3–16)
AST SERPL-CCNC: 16 U/L (ref 15–37)
BASOPHILS ABSOLUTE: 0 K/UL (ref 0–0.2)
BASOPHILS RELATIVE PERCENT: 0.4 %
BILIRUB SERPL-MCNC: <0.2 MG/DL (ref 0–1)
BUN BLDV-MCNC: 11 MG/DL (ref 7–20)
CALCIUM SERPL-MCNC: 9.1 MG/DL (ref 8.3–10.6)
CHLORIDE BLD-SCNC: 101 MMOL/L (ref 99–110)
CO2: 24 MMOL/L (ref 21–32)
CREAT SERPL-MCNC: 0.6 MG/DL (ref 0.9–1.3)
EOSINOPHILS ABSOLUTE: 0.2 K/UL (ref 0–0.6)
EOSINOPHILS RELATIVE PERCENT: 1.5 %
GFR AFRICAN AMERICAN: >60
GFR NON-AFRICAN AMERICAN: >60
GLUCOSE BLD-MCNC: 100 MG/DL (ref 70–99)
HCT VFR BLD CALC: 36.7 % (ref 40.5–52.5)
HEMOGLOBIN: 12.3 G/DL (ref 13.5–17.5)
LACTIC ACID, SEPSIS: 0.6 MMOL/L (ref 0.4–1.9)
LYMPHOCYTES ABSOLUTE: 2.3 K/UL (ref 1–5.1)
LYMPHOCYTES RELATIVE PERCENT: 21.5 %
MCH RBC QN AUTO: 28.7 PG (ref 26–34)
MCHC RBC AUTO-ENTMCNC: 33.5 G/DL (ref 31–36)
MCV RBC AUTO: 85.9 FL (ref 80–100)
MONOCYTES ABSOLUTE: 0.5 K/UL (ref 0–1.3)
MONOCYTES RELATIVE PERCENT: 4.6 %
NEUTROPHILS ABSOLUTE: 7.8 K/UL (ref 1.7–7.7)
NEUTROPHILS RELATIVE PERCENT: 72 %
PDW BLD-RTO: 13 % (ref 12.4–15.4)
PLATELET # BLD: 370 K/UL (ref 135–450)
PMV BLD AUTO: 6.6 FL (ref 5–10.5)
POTASSIUM REFLEX MAGNESIUM: 4.3 MMOL/L (ref 3.5–5.1)
PROCALCITONIN: 0.37 NG/ML (ref 0–0.15)
RBC # BLD: 4.27 M/UL (ref 4.2–5.9)
SARS-COV-2, NAAT: NOT DETECTED
SEDIMENTATION RATE, ERYTHROCYTE: 39 MM/HR (ref 0–15)
SODIUM BLD-SCNC: 136 MMOL/L (ref 136–145)
TOTAL PROTEIN: 7.1 G/DL (ref 6.4–8.2)
WBC # BLD: 10.8 K/UL (ref 4–11)

## 2021-10-28 PROCEDURE — 6370000000 HC RX 637 (ALT 250 FOR IP): Performed by: NURSE PRACTITIONER

## 2021-10-28 PROCEDURE — 73140 X-RAY EXAM OF FINGER(S): CPT

## 2021-10-28 PROCEDURE — 84145 PROCALCITONIN (PCT): CPT

## 2021-10-28 PROCEDURE — 90471 IMMUNIZATION ADMIN: CPT | Performed by: NURSE PRACTITIONER

## 2021-10-28 PROCEDURE — 6360000002 HC RX W HCPCS: Performed by: INTERNAL MEDICINE

## 2021-10-28 PROCEDURE — 2580000003 HC RX 258: Performed by: INTERNAL MEDICINE

## 2021-10-28 PROCEDURE — 87040 BLOOD CULTURE FOR BACTERIA: CPT

## 2021-10-28 PROCEDURE — 96367 TX/PROPH/DG ADDL SEQ IV INF: CPT

## 2021-10-28 PROCEDURE — 85652 RBC SED RATE AUTOMATED: CPT

## 2021-10-28 PROCEDURE — 2580000003 HC RX 258: Performed by: NURSE PRACTITIONER

## 2021-10-28 PROCEDURE — 6360000002 HC RX W HCPCS: Performed by: NURSE PRACTITIONER

## 2021-10-28 PROCEDURE — 83605 ASSAY OF LACTIC ACID: CPT

## 2021-10-28 PROCEDURE — 85025 COMPLETE CBC W/AUTO DIFF WBC: CPT

## 2021-10-28 PROCEDURE — 90715 TDAP VACCINE 7 YRS/> IM: CPT | Performed by: NURSE PRACTITIONER

## 2021-10-28 PROCEDURE — 87635 SARS-COV-2 COVID-19 AMP PRB: CPT

## 2021-10-28 PROCEDURE — 96375 TX/PRO/DX INJ NEW DRUG ADDON: CPT

## 2021-10-28 PROCEDURE — 80053 COMPREHEN METABOLIC PANEL: CPT

## 2021-10-28 PROCEDURE — 96365 THER/PROPH/DIAG IV INF INIT: CPT

## 2021-10-28 PROCEDURE — 99283 EMERGENCY DEPT VISIT LOW MDM: CPT

## 2021-10-28 PROCEDURE — 86140 C-REACTIVE PROTEIN: CPT

## 2021-10-28 PROCEDURE — 1200000000 HC SEMI PRIVATE

## 2021-10-28 RX ORDER — CLONIDINE HYDROCHLORIDE 0.1 MG/1
0.1 TABLET ORAL PRN
Status: DISCONTINUED | OUTPATIENT
Start: 2021-10-28 | End: 2021-10-29

## 2021-10-28 RX ORDER — ONDANSETRON 4 MG/1
4 TABLET, ORALLY DISINTEGRATING ORAL EVERY 8 HOURS PRN
Status: DISCONTINUED | OUTPATIENT
Start: 2021-10-28 | End: 2021-10-29

## 2021-10-28 RX ORDER — KETOROLAC TROMETHAMINE 30 MG/ML
30 INJECTION, SOLUTION INTRAMUSCULAR; INTRAVENOUS EVERY 6 HOURS PRN
Status: DISCONTINUED | OUTPATIENT
Start: 2021-10-28 | End: 2021-10-29

## 2021-10-28 RX ORDER — SODIUM CHLORIDE 9 MG/ML
25 INJECTION, SOLUTION INTRAVENOUS PRN
Status: DISCONTINUED | OUTPATIENT
Start: 2021-10-28 | End: 2021-10-29

## 2021-10-28 RX ORDER — ONDANSETRON 2 MG/ML
4 INJECTION INTRAMUSCULAR; INTRAVENOUS EVERY 6 HOURS PRN
Status: DISCONTINUED | OUTPATIENT
Start: 2021-10-28 | End: 2021-10-29

## 2021-10-28 RX ORDER — TRAMADOL HYDROCHLORIDE 50 MG/1
50 TABLET ORAL PRN
Status: DISCONTINUED | OUTPATIENT
Start: 2021-10-28 | End: 2021-10-29

## 2021-10-28 RX ORDER — KETOROLAC TROMETHAMINE 30 MG/ML
15 INJECTION, SOLUTION INTRAMUSCULAR; INTRAVENOUS ONCE
Status: COMPLETED | OUTPATIENT
Start: 2021-10-28 | End: 2021-10-28

## 2021-10-28 RX ORDER — POLYETHYLENE GLYCOL 3350 17 G/17G
17 POWDER, FOR SOLUTION ORAL DAILY PRN
Status: DISCONTINUED | OUTPATIENT
Start: 2021-10-28 | End: 2021-10-30 | Stop reason: HOSPADM

## 2021-10-28 RX ORDER — PROMETHAZINE HYDROCHLORIDE 25 MG/1
25 TABLET ORAL EVERY 6 HOURS PRN
Status: DISCONTINUED | OUTPATIENT
Start: 2021-10-28 | End: 2021-10-30 | Stop reason: HOSPADM

## 2021-10-28 RX ORDER — SODIUM CHLORIDE 0.9 % (FLUSH) 0.9 %
5-40 SYRINGE (ML) INJECTION EVERY 12 HOURS SCHEDULED
Status: DISCONTINUED | OUTPATIENT
Start: 2021-10-28 | End: 2021-10-30 | Stop reason: HOSPADM

## 2021-10-28 RX ORDER — ACETAMINOPHEN 325 MG/1
650 TABLET ORAL EVERY 6 HOURS PRN
Status: DISCONTINUED | OUTPATIENT
Start: 2021-10-28 | End: 2021-10-30 | Stop reason: HOSPADM

## 2021-10-28 RX ORDER — HYDROXYZINE PAMOATE 50 MG/1
50 CAPSULE ORAL EVERY 8 HOURS PRN
Status: DISCONTINUED | OUTPATIENT
Start: 2021-10-28 | End: 2021-10-30 | Stop reason: HOSPADM

## 2021-10-28 RX ORDER — GABAPENTIN 300 MG/1
300 CAPSULE ORAL EVERY 8 HOURS PRN
Status: DISCONTINUED | OUTPATIENT
Start: 2021-10-28 | End: 2021-10-30 | Stop reason: HOSPADM

## 2021-10-28 RX ORDER — ACETAMINOPHEN 650 MG/1
650 SUPPOSITORY RECTAL EVERY 6 HOURS PRN
Status: DISCONTINUED | OUTPATIENT
Start: 2021-10-28 | End: 2021-10-30 | Stop reason: HOSPADM

## 2021-10-28 RX ORDER — SODIUM CHLORIDE 0.9 % (FLUSH) 0.9 %
5-40 SYRINGE (ML) INJECTION PRN
Status: DISCONTINUED | OUTPATIENT
Start: 2021-10-28 | End: 2021-10-29

## 2021-10-28 RX ADMIN — PROMETHAZINE HYDROCHLORIDE 25 MG: 25 TABLET ORAL at 18:40

## 2021-10-28 RX ADMIN — KETOROLAC TROMETHAMINE 30 MG: 30 INJECTION, SOLUTION INTRAMUSCULAR at 21:18

## 2021-10-28 RX ADMIN — PIPERACILLIN SODIUM AND TAZOBACTAM SODIUM 3375 MG: 3; .375 INJECTION, POWDER, LYOPHILIZED, FOR SOLUTION INTRAVENOUS at 17:43

## 2021-10-28 RX ADMIN — GABAPENTIN 300 MG: 300 CAPSULE ORAL at 21:18

## 2021-10-28 RX ADMIN — CEFEPIME 2000 MG: 2 INJECTION, POWDER, FOR SOLUTION INTRAVENOUS at 16:41

## 2021-10-28 RX ADMIN — TETANUS TOXOID, REDUCED DIPHTHERIA TOXOID AND ACELLULAR PERTUSSIS VACCINE, ADSORBED 0.5 ML: 5; 2.5; 8; 8; 2.5 SUSPENSION INTRAMUSCULAR at 18:35

## 2021-10-28 RX ADMIN — KETOROLAC TROMETHAMINE 15 MG: 30 INJECTION, SOLUTION INTRAMUSCULAR at 18:46

## 2021-10-28 RX ADMIN — TRAMADOL HYDROCHLORIDE 50 MG: 50 TABLET, FILM COATED ORAL at 18:40

## 2021-10-28 RX ADMIN — VANCOMYCIN HYDROCHLORIDE 1000 MG: 1 INJECTION, POWDER, LYOPHILIZED, FOR SOLUTION INTRAVENOUS at 18:27

## 2021-10-28 RX ADMIN — HYDROXYZINE PAMOATE 50 MG: 50 CAPSULE ORAL at 21:18

## 2021-10-28 RX ADMIN — VANCOMYCIN HYDROCHLORIDE 1250 MG: 10 INJECTION, POWDER, LYOPHILIZED, FOR SOLUTION INTRAVENOUS at 22:47

## 2021-10-28 ASSESSMENT — ENCOUNTER SYMPTOMS
SHORTNESS OF BREATH: 0
SORE THROAT: 0
RHINORRHEA: 0
COLOR CHANGE: 0
ABDOMINAL PAIN: 0

## 2021-10-28 ASSESSMENT — PAIN SCALES - GENERAL
PAINLEVEL_OUTOF10: 7
PAINLEVEL_OUTOF10: 10
PAINLEVEL_OUTOF10: 7

## 2021-10-28 NOTE — ED NOTES
1822- Perfect serve sent to Dr. Danielle Nguyen for admission      [facility]  10/28/21 1822

## 2021-10-28 NOTE — ED NOTES
Pt states he received dilaudid and ativan when he was at [facility] and wants us to do at least what they did for him so he doesn't sign out AMA.       Denisse Howard RN  10/28/21 7119

## 2021-10-28 NOTE — ED PROVIDER NOTES
I independently examined and evaluated Hugh Gary. All diagnostic, treatment, and disposition decisions were made by myself in conjunction with the JENNY, Brenda Denney. For all further details of the patient's emergency department visit, please see their documentation. [id number] male with a history of IV drug abuse presents with concern for infection to his left thumb. He recently had surgery 2 weeks ago for a felon to his left thumb. He signed out [address] after the surgery did not receive any antibiotics. He states it was because he was not getting the pain medicine he needed and he instead went and used IV heroin. He is back today because he is concerned it is getting infected. He denies any fevers. He states that the skin on the thumb has become discolored and is malodorous. Vitals:    10/28/21 1549   BP: (!) 156/91   Pulse: 103   Resp: 18   Temp: 98.2 °F (36.8 °C)   SpO2: 99%   Here is left thumb is edematous and malodorous. There is a purulent drainage noted from the surgical incision on the volar aspect of the thumb. Range of motion is intact at the IP joint. He is slightly tachycardic here.       Results for orders placed or performed during the hospital encounter of 10/28/21   CBC auto differential   Result Value Ref Range    WBC 10.8 4.0 - 11.0 K/uL    RBC 4.27 4.20 - 5.90 M/uL    Hemoglobin 12.3 (L) 13.5 - 17.5 g/dL    Hematocrit 36.7 (L) 40.5 - 52.5 %    MCV 85.9 80.0 - 100.0 fL    MCH 28.7 26.0 - 34.0 pg    MCHC 33.5 31.0 - 36.0 g/dL    RDW 13.0 12.4 - 15.4 %    Platelets 480 926 - 300 K/uL    MPV 6.6 5.0 - 10.5 fL    Neutrophils % 72.0 %    Lymphocytes % 21.5 %    Monocytes % 4.6 %    Eosinophils % 1.5 %    Basophils % 0.4 %    Neutrophils Absolute 7.8 (H) 1.7 - 7.7 K/uL    Lymphocytes Absolute 2.3 1.0 - 5.1 K/uL    Monocytes Absolute 0.5 0.0 - 1.3 K/uL    Eosinophils Absolute 0.2 0.0 - 0.6 K/uL    Basophils Absolute 0.0 0.0 - 0.2 K/uL   Comprehensive Metabolic Panel w/ Reflex to MG   Result Value Ref Range    Sodium 136 136 - 145 mmol/L    Potassium reflex Magnesium 4.3 3.5 - 5.1 mmol/L    Chloride 101 99 - 110 mmol/L    CO2 24 21 - 32 mmol/L    Anion Gap 11 3 - 16    Glucose 100 (H) 70 - 99 mg/dL    BUN 11 7 - 20 mg/dL    CREATININE 0.6 (L) 0.9 - 1.3 mg/dL    GFR Non-African American >60 >60    GFR African American >60 >60    Calcium 9.1 8.3 - 10.6 mg/dL    Total Protein 7.1 6.4 - 8.2 g/dL    Albumin 4.1 3.4 - 5.0 g/dL    Albumin/Globulin Ratio 1.4 1.1 - 2.2    Total Bilirubin <0.2 0.0 - 1.0 mg/dL    Alkaline Phosphatase 87 40 - 129 U/L    ALT 13 10 - 40 U/L    AST 16 15 - 37 U/L   Procalcitonin   Result Value Ref Range    Procalcitonin 0.37 (H) 0.00 - 0.15 ng/mL   Lactate, Sepsis   Result Value Ref Range    Lactic Acid, Sepsis 0.6 0.4 - 1.9 mmol/L         XR FINGER LEFT (MIN 2 VIEWS)   Final Result   Soft tissue swelling with soft tissue gas. While that gas may be introduced   via a laceration, a gas-forming infection must be considered, including   necrotizing infections. Questionable nondisplaced fracture of the radial aspect distal phalanx at the   interphalangeal joint. Findings were discussed with Leo Burgos at 4 the:39 pm on 10/28/2021. Here the x-ray does show some soft tissue gas. This is likely from the recent I&D with no open wound noted. It does appear grossly infected however. We have ordered IV antibiotics. Orthopedic surgery has been consulted. He will be admitted to the hospitalist for IV antibiotics. The patient assures us he will not sign out [address]        Garrett Shaw MD  10/30/21 2155

## 2021-10-28 NOTE — PLAN OF CARE
H/o IVDA  Recent admission for left thumb infection after trauma- left AMA    thumb infection    Ortho  unasyn  vanc

## 2021-10-28 NOTE — ED PROVIDER NOTES
[address] ED  EMERGENCY DEPARTMENT ENCOUNTER        Pt Name: Shari Carbajal  MRN: [id number]  [place] 1995  Date of evaluation: 10/28/2021  Provider: ANA Lopez - CNP  PCP: No primary care provider on file. ED Attending: No att. providers found    Jameel Denis       Chief Complaint   Patient presents with    Wound Check     pt c/o worsening L thumb pain. \"surgery\" apprx 1.5 weeks ago and did not follow up. sts original injury was hammer \"blunt force\". did not get ATX. hx hep B & C. no fevers. IV heroin use just PTA. eyes drooping. intermt anxiety then dozing off. resp unlabore. bandage to L hand. pt reports odor / discoloration. +PMS. no streaking noted       HISTORY OF PRESENT ILLNESS   (Location/Symptom, Timing/Onset, Context/Setting, Quality, Duration, Modifying Factors, Severity)  Note limiting factors. Shari Carbajal is a 27 y.o. male for left thumb pain. Onset was the last few days. Context includes patient states he initially injured his left thumb with a hammer. Patient reports that he had surgery on 10/19/2021 by Ortho and was on IV antibiotics. Patient reports that ultimately he decided to leave the hospital on 10/25/2021 [address] He reports that he did not have any antibiotics when he left. Patient admits to using heroin at least 2-3 times a day. He denies suicidal or homicidal ideations. Patient was offered admission again today and states that he was willing to stay for IV antibiotics if necessary. Patient denies any fevers at home. Patient is right-hand dominant. Alleviating factors include nothing. Aggravating factors include nothing. Pain is 7/10. Nothing has been used for pain today. Nursing Notes were all reviewed and agreed with or any disagreements were addressed  in the HPI. REVIEW OF SYSTEMS  (2-9 systems for level 4, 10 or more for level 5)     Review of Systems   Constitutional: Negative for fever.    HENT: Negative for congestion, rhinorrhea and sore throat. Respiratory: Negative for shortness of breath. Cardiovascular: Negative for chest pain. Gastrointestinal: Negative for abdominal pain. Genitourinary: Negative for decreased urine volume and difficulty urinating. Musculoskeletal: Negative for arthralgias and myalgias. Left thumb injury   Skin: Positive for wound. Negative for color change and rash. Neurological: Negative for dizziness and light-headedness. Psychiatric/Behavioral: Negative for agitation. All other systems reviewed and are negative. Positivesand Pertinent negatives as per HPI. Except as noted above in the ROS, all other systems were reviewed and negative. PAST MEDICAL HISTORY     Past Medical History:   Diagnosis Date    Asthma     Hepatitis B surface antigen positive 5/11/18, 5/1/18, 12/21/2017    Hepatitis C antibody positive in blood 5/11/18, 5/4/18, 12/21/2017    MRSA (methicillin resistant staph aureus) culture positive 10/20/2021    Hand         SURGICAL HISTORY       Past Surgical History:   Procedure Laterality Date    HAND SURGERY Left 10/20/2021    LEFT THUMB INCISION AND DRAINAGE performed by Ingrid Wilder MD at [address] [place] [address]       Previous Medications    No medications on file         ALLERGIES     Patient has no known allergies. FAMILY HISTORY     History reviewed. No pertinent family history.       SOCIAL HISTORY       Social History     Socioeconomic History    Marital status: Single     Spouse name: None    Number of children: None    Years of education: None    Highest education level: None   Occupational History    None   Tobacco Use    Smoking status: Former Smoker     Packs/day: 0.50     Years: 5.00     Pack years: 2.50     Types: Cigarettes    Smokeless tobacco: Never Used   Vaping Use    Vaping Use: Some days    Substances: CBD    Devices: Pre-filled or refillable cartridge Substance and Sexual Activity    Alcohol use: No     Comment: occasional    Drug use: Yes     Types: Marijuana, Opiates , IV     Comment: Clean x 2 weeks    Sexual activity: Yes     Partners: Female   Other Topics Concern    None   Social History Narrative    None     Social Determinants of Health     Financial Resource Strain:     Difficulty of Paying Living Expenses:    Food Insecurity:     Worried About Running Out of Food in the Last Year:     Ran Out of Food in the Last Year:    Transportation Needs:     Lack of Transportation (Medical):  Lack of Transportation (Non-Medical):    Physical Activity:     Days of Exercise per Week:     Minutes of Exercise per Session:    Stress:     Feeling of Stress :    Social Connections:     Frequency of Communication with Friends and Family:     Frequency of Social Gatherings with Friends and Family:     Attends Zoroastrianism Services:     Active Member of Clubs or Organizations:     Attends Club or Organization Meetings:     Marital Status:    Intimate Partner Violence:     Fear of Current or Ex-Partner:     Emotionally Abused:     Physically Abused:     Sexually Abused:        SCREENINGS             PHYSICAL EXAM    (up to 7 for level 4, 8 ormore for level 5)     ED Triage Vitals [10/28/21 1549]   BP Temp Temp src Pulse Resp SpO2 Height Weight   (!) 156/91 98.2 °F (36.8 °C) -- 103 18 99 % -- 132 lb (59.9 kg)       Physical Exam  Constitutional:       Appearance: He is well-developed. HENT:      Head: Normocephalic and atraumatic. Cardiovascular:      Rate and Rhythm: Normal rate. Pulmonary:      Effort: Pulmonary effort is normal. No respiratory distress. Abdominal:      General: There is no distension. Palpations: Abdomen is soft. Tenderness: There is no abdominal tenderness. Musculoskeletal:         General: Swelling, tenderness and signs of injury present. No deformity. Cervical back: Normal range of motion.       Comments: Decreased range of motion to left thumb due to paleness with movement. Surgical incision appears open. See picture documentations for photos. Patient is right-hand dominant   Skin:     General: Skin is warm and dry. Neurological:      Mental Status: He is alert and oriented to person, place, and time.          DIAGNOSTIC RESULTS   LABS:    Labs Reviewed   CBC WITH AUTO DIFFERENTIAL - Abnormal; Notable for the following components:       Result Value    Hemoglobin 12.3 (*)     Hematocrit 36.7 (*)     Neutrophils Absolute 7.8 (*)     All other components within normal limits    Narrative:     Performed at:  [facility],  [employer]   Phone [phone]   COMPREHENSIVE METABOLIC PANEL W/ REFLEX TO MG FOR LOW K - Abnormal; Notable for the following components:    Glucose 100 (*)     CREATININE 0.6 (*)     All other components within normal limits    Narrative:     Performed at:  [facility]) - [facility],  "[employer]"   Phone [phone]   PROCALCITONIN - Abnormal; Notable for the following components:    Procalcitonin 0.37 (*)     All other components within normal limits    Narrative:     Performed at:  [facility] [address],  [employer] [employer]   Phone [phone]   SEDIMENTATION RATE - Abnormal; Notable for the following components:    Sed Rate 39 (*)     All other components within normal limits    Narrative:     Performed at:  [facility] [address],  [employer], [employer]   Phone [phone]   CULTURE, BLOOD 2   CULTURE, BLOOD 1   LACTATE, SEPSIS    Narrative:     Performed at:  formerly [facility],  [place], [employer]   Phone [phone]   URINE RT REFLEX TO CULTURE   LACTATE, SEPSIS   C-REACTIVE PROTEIN       All other labs were within normal range or not returned as of this dictation. EKG: All EKG's are interpreted by the Emergency Department Physician who either signs or Co-signs this chart in the absence of a cardiologist.  Please see their note for interpretation of EKG. RADIOLOGY:     Left finger x-ray interpreted by radiologist for  Impression:    Soft tissue swelling with soft tissue gas.  While that gas may be introduced   via a laceration, a gas-forming infection must be considered, including   necrotizing infections. Questionable nondisplaced fracture of the radial aspect distal phalanx at the   interphalangeal joint. Interpretation per the Radiologist below, if available at the time of this note:    XR FINGER LEFT (MIN 2 VIEWS)   Final Result   Soft tissue swelling with soft tissue gas. While that gas may be introduced   via a laceration, a gas-forming infection must be considered, including   necrotizing infections. Questionable nondisplaced fracture of the radial aspect distal phalanx at the   interphalangeal joint. Findings were discussed with Jack Alvarado at 4 the:39 pm on 10/28/2021. No results found.       PROCEDURES   Unless otherwise noted below, none     Procedures    CRITICAL CARE TIME   N/A    CONSULTS:  IP CONSULT TO ORTHOPEDIC SURGERY  IP CONSULT TO HOSPITALIST  IP CONSULT TO PHARMACY  IP CONSULT TO ORTHOPEDIC SURGERY      EMERGENCY DEPARTMENT COURSE and DIFFERENTIAL DIAGNOSIS/MDM:   Vitals:    Vitals:    10/28/21 1549 10/28/21 1833   BP: (!) 156/91    Pulse: 103    Resp: 18    Temp: 98.2 °F (36.8 °C)    SpO2: 99% 100%   Weight: 132 lb (59.9 kg)        Patient was given the following medications:  Medications   vancomycin 1000 mg IVPB in 250 mL D5W addavial (1,000 mg IntraVENous New Bag 10/28/21 1827)   traMADol (ULTRAM) tablet 50 mg (50 mg Oral Given 10/28/21 1840)     And   cloNIDine (CATAPRES) tablet 0.1 mg (has no administration in time range)   hydrOXYzine (VISTARIL) capsule 50 mg (has no administration in time range)   promethazine (PHENERGAN) tablet 25 mg (25 mg Oral Given 10/28/21 1840)   gabapentin (NEURONTIN) capsule 300 mg (has no administration in time range)   cefepime (MAXIPIME) 2000 mg IVPB minibag (0 mg IntraVENous Stopped 10/28/21 1711)   piperacillin-tazobactam (ZOSYN) 3,375 mg in sodium chloride 0.9 % 100 mL IVPB (mini-bag) (0 mg IntraVENous Stopped 10/28/21 1827)   Tetanus-Diphth-Acell Pertussis (BOOSTRIX) injection 0.5 mL (0.5 mLs IntraMUSCular Given 10/28/21 1835)   ketorolac (TORADOL) injection 15 mg (15 mg IntraVENous Given 10/28/21 1846)             Patient was seen and evaluated by myself and Dr. Akil Coronel. Patient here for concern for left thumb injury. Patient initially states that he had an injury to his thumb from a hammer. Patient states that he was admitted on 10/19/2021 by Ortho and had surgery for an incision and drainage. Patient reports that he was on IV antibiotics at that admission however elected to leave [address] on 10/25/2021. Patient states that he did not have antibiotics at home. Patient states he is having increased pain and is concerned for infection. On exam he is awake and alert he was found to be tachycardic. Sepsis protocol was initiated he was given IV fluids and IV antibiotics. Lab values have been reviewed and interpreted. Call was placed to orthopedics who did not necessarily feel the patient needed to be admitted from their standpoint. They reported that he had a felon. They recommended monitoring his lab work and if we felt that he needed admitted to admit to medicine. Based on his tachycardia and concerns for infection based on his x-ray patient will be admitted. Consult was placed to the hospitalist for admission. Orthopedics was okay with him being admitted at [facility]. Hospitalist did not return the call however he did place admission orders. Patient's care was transferred to the inpatient unit.       The patient tolerated their visit well. They were seen and evaluated by the attending physician, No att. providers found who agreed with the assessment and plan. The patient and / or the family were informed of the results of any tests, a time was given to answer questions, a plan was proposed and they agreed with plan. FINAL IMPRESSION      1. Felon of finger    2. Non compliance with medical treatment          DISPOSITION/PLAN   DISPOSITION Admitted 10/28/2021 06:33:54 PM      PATIENT REFERRED TO:  No follow-up provider specified.     DISCHARGE MEDICATIONS:  New Prescriptions    No medications on file       DISCONTINUED MEDICATIONS:  Discontinued Medications    No medications on file              (Please note that portions of this note were completed with a voice recognition program.  Efforts were made to edit the dictations but occasionally words are mis-transcribed.)    ANA Bowman CNP (electronically signed)       ANA Bowman CNP  10/28/21 7596

## 2021-10-28 NOTE — ED NOTES
[facility] NP perfect served ortho herself and was completing the consult to PEPPER Vaca [address]  10/28/21 [address]

## 2021-10-29 ENCOUNTER — ANESTHESIA (OUTPATIENT)
Dept: OPERATING ROOM | Age: 26
DRG: 364 | End: 2021-10-29
Payer: COMMERCIAL

## 2021-10-29 ENCOUNTER — ANESTHESIA EVENT (OUTPATIENT)
Dept: OPERATING ROOM | Age: 26
DRG: 364 | End: 2021-10-29
Payer: COMMERCIAL

## 2021-10-29 VITALS — SYSTOLIC BLOOD PRESSURE: 107 MMHG | DIASTOLIC BLOOD PRESSURE: 59 MMHG | OXYGEN SATURATION: 100 %

## 2021-10-29 LAB
C-REACTIVE PROTEIN: 6.7 MG/L (ref 0–5.1)
VANCOMYCIN TROUGH: 10.3 UG/ML (ref 10–20)

## 2021-10-29 PROCEDURE — 6370000000 HC RX 637 (ALT 250 FOR IP): Performed by: ORTHOPAEDIC SURGERY

## 2021-10-29 PROCEDURE — 6360000002 HC RX W HCPCS: Performed by: PHYSICIAN ASSISTANT

## 2021-10-29 PROCEDURE — 3600000002 HC SURGERY LEVEL 2 BASE: Performed by: ORTHOPAEDIC SURGERY

## 2021-10-29 PROCEDURE — 6360000002 HC RX W HCPCS: Performed by: ORTHOPAEDIC SURGERY

## 2021-10-29 PROCEDURE — 2709999900 HC NON-CHARGEABLE SUPPLY: Performed by: ORTHOPAEDIC SURGERY

## 2021-10-29 PROCEDURE — 87186 SC STD MICRODIL/AGAR DIL: CPT

## 2021-10-29 PROCEDURE — 99222 1ST HOSP IP/OBS MODERATE 55: CPT | Performed by: PHYSICIAN ASSISTANT

## 2021-10-29 PROCEDURE — 1200000000 HC SEMI PRIVATE

## 2021-10-29 PROCEDURE — 7100000001 HC PACU RECOVERY - ADDTL 15 MIN: Performed by: ORTHOPAEDIC SURGERY

## 2021-10-29 PROCEDURE — 2580000003 HC RX 258: Performed by: NURSE ANESTHETIST, CERTIFIED REGISTERED

## 2021-10-29 PROCEDURE — 6370000000 HC RX 637 (ALT 250 FOR IP): Performed by: INTERNAL MEDICINE

## 2021-10-29 PROCEDURE — 2500000003 HC RX 250 WO HCPCS: Performed by: ORTHOPAEDIC SURGERY

## 2021-10-29 PROCEDURE — 6360000002 HC RX W HCPCS: Performed by: NURSE ANESTHETIST, CERTIFIED REGISTERED

## 2021-10-29 PROCEDURE — 87205 SMEAR GRAM STAIN: CPT

## 2021-10-29 PROCEDURE — 87070 CULTURE OTHR SPECIMN AEROBIC: CPT

## 2021-10-29 PROCEDURE — 87077 CULTURE AEROBIC IDENTIFY: CPT

## 2021-10-29 PROCEDURE — 99253 IP/OBS CNSLTJ NEW/EST LOW 45: CPT | Performed by: ORTHOPAEDIC SURGERY

## 2021-10-29 PROCEDURE — 6360000002 HC RX W HCPCS: Performed by: INTERNAL MEDICINE

## 2021-10-29 PROCEDURE — 2500000003 HC RX 250 WO HCPCS: Performed by: NURSE ANESTHETIST, CERTIFIED REGISTERED

## 2021-10-29 PROCEDURE — 0JBK0ZZ EXCISION OF LEFT HAND SUBCUTANEOUS TISSUE AND FASCIA, OPEN APPROACH: ICD-10-PCS | Performed by: ORTHOPAEDIC SURGERY

## 2021-10-29 PROCEDURE — 3600000012 HC SURGERY LEVEL 2 ADDTL 15MIN: Performed by: ORTHOPAEDIC SURGERY

## 2021-10-29 PROCEDURE — 87075 CULTR BACTERIA EXCEPT BLOOD: CPT

## 2021-10-29 PROCEDURE — 2580000003 HC RX 258: Performed by: ORTHOPAEDIC SURGERY

## 2021-10-29 PROCEDURE — 26011 DRAINAGE OF FINGER ABSCESS: CPT | Performed by: ORTHOPAEDIC SURGERY

## 2021-10-29 PROCEDURE — 36415 COLL VENOUS BLD VENIPUNCTURE: CPT

## 2021-10-29 PROCEDURE — 80202 ASSAY OF VANCOMYCIN: CPT

## 2021-10-29 PROCEDURE — 2580000003 HC RX 258: Performed by: INTERNAL MEDICINE

## 2021-10-29 PROCEDURE — 7100000000 HC PACU RECOVERY - FIRST 15 MIN: Performed by: ORTHOPAEDIC SURGERY

## 2021-10-29 PROCEDURE — 3700000001 HC ADD 15 MINUTES (ANESTHESIA): Performed by: ORTHOPAEDIC SURGERY

## 2021-10-29 PROCEDURE — 3700000000 HC ANESTHESIA ATTENDED CARE: Performed by: ORTHOPAEDIC SURGERY

## 2021-10-29 PROCEDURE — 86403 PARTICLE AGGLUT ANTBDY SCRN: CPT

## 2021-10-29 RX ORDER — ONDANSETRON 2 MG/ML
4 INJECTION INTRAMUSCULAR; INTRAVENOUS
Status: DISCONTINUED | OUTPATIENT
Start: 2021-10-29 | End: 2021-10-29 | Stop reason: HOSPADM

## 2021-10-29 RX ORDER — OXYCODONE HYDROCHLORIDE AND ACETAMINOPHEN 5; 325 MG/1; MG/1
1 TABLET ORAL PRN
Status: DISCONTINUED | OUTPATIENT
Start: 2021-10-29 | End: 2021-10-29 | Stop reason: HOSPADM

## 2021-10-29 RX ORDER — SODIUM CHLORIDE 0.9 % (FLUSH) 0.9 %
5-40 SYRINGE (ML) INJECTION PRN
Status: DISCONTINUED | OUTPATIENT
Start: 2021-10-29 | End: 2021-10-30 | Stop reason: HOSPADM

## 2021-10-29 RX ORDER — KETOROLAC TROMETHAMINE 30 MG/ML
INJECTION, SOLUTION INTRAMUSCULAR; INTRAVENOUS PRN
Status: DISCONTINUED | OUTPATIENT
Start: 2021-10-29 | End: 2021-10-29 | Stop reason: SDUPTHER

## 2021-10-29 RX ORDER — ONDANSETRON 2 MG/ML
4 INJECTION INTRAMUSCULAR; INTRAVENOUS EVERY 6 HOURS PRN
Status: DISCONTINUED | OUTPATIENT
Start: 2021-10-29 | End: 2021-10-30 | Stop reason: HOSPADM

## 2021-10-29 RX ORDER — PROPOFOL 10 MG/ML
INJECTION, EMULSION INTRAVENOUS PRN
Status: DISCONTINUED | OUTPATIENT
Start: 2021-10-29 | End: 2021-10-29 | Stop reason: SDUPTHER

## 2021-10-29 RX ORDER — NICOTINE 21 MG/24HR
1 PATCH, TRANSDERMAL 24 HOURS TRANSDERMAL DAILY
Status: DISCONTINUED | OUTPATIENT
Start: 2021-10-29 | End: 2021-10-30 | Stop reason: HOSPADM

## 2021-10-29 RX ORDER — FENTANYL CITRATE 50 UG/ML
INJECTION, SOLUTION INTRAMUSCULAR; INTRAVENOUS PRN
Status: DISCONTINUED | OUTPATIENT
Start: 2021-10-29 | End: 2021-10-29 | Stop reason: SDUPTHER

## 2021-10-29 RX ORDER — NICOTINE 21 MG/24HR
1 PATCH, TRANSDERMAL 24 HOURS TRANSDERMAL DAILY
Status: DISCONTINUED | OUTPATIENT
Start: 2021-10-30 | End: 2021-10-29

## 2021-10-29 RX ORDER — DIPHENHYDRAMINE HYDROCHLORIDE 50 MG/ML
INJECTION INTRAMUSCULAR; INTRAVENOUS PRN
Status: DISCONTINUED | OUTPATIENT
Start: 2021-10-29 | End: 2021-10-29 | Stop reason: SDUPTHER

## 2021-10-29 RX ORDER — FENTANYL CITRATE 50 UG/ML
25 INJECTION, SOLUTION INTRAMUSCULAR; INTRAVENOUS EVERY 5 MIN PRN
Status: DISCONTINUED | OUTPATIENT
Start: 2021-10-29 | End: 2021-10-29 | Stop reason: HOSPADM

## 2021-10-29 RX ORDER — SODIUM CHLORIDE 9 MG/ML
25 INJECTION, SOLUTION INTRAVENOUS PRN
Status: DISCONTINUED | OUTPATIENT
Start: 2021-10-29 | End: 2021-10-30 | Stop reason: HOSPADM

## 2021-10-29 RX ORDER — ONDANSETRON 4 MG/1
4 TABLET, ORALLY DISINTEGRATING ORAL EVERY 8 HOURS PRN
Status: DISCONTINUED | OUTPATIENT
Start: 2021-10-29 | End: 2021-10-30 | Stop reason: HOSPADM

## 2021-10-29 RX ORDER — BUPIVACAINE HYDROCHLORIDE 5 MG/ML
INJECTION, SOLUTION PERINEURAL PRN
Status: DISCONTINUED | OUTPATIENT
Start: 2021-10-29 | End: 2021-10-29 | Stop reason: ALTCHOICE

## 2021-10-29 RX ORDER — LIDOCAINE HYDROCHLORIDE 20 MG/ML
INJECTION, SOLUTION INFILTRATION; PERINEURAL PRN
Status: DISCONTINUED | OUTPATIENT
Start: 2021-10-29 | End: 2021-10-29 | Stop reason: SDUPTHER

## 2021-10-29 RX ORDER — DEXAMETHASONE SODIUM PHOSPHATE 4 MG/ML
INJECTION, SOLUTION INTRA-ARTICULAR; INTRALESIONAL; INTRAMUSCULAR; INTRAVENOUS; SOFT TISSUE PRN
Status: DISCONTINUED | OUTPATIENT
Start: 2021-10-29 | End: 2021-10-29 | Stop reason: SDUPTHER

## 2021-10-29 RX ORDER — OXYCODONE HYDROCHLORIDE 5 MG/1
5 TABLET ORAL EVERY 6 HOURS PRN
Status: DISCONTINUED | OUTPATIENT
Start: 2021-10-29 | End: 2021-10-30 | Stop reason: HOSPADM

## 2021-10-29 RX ORDER — SODIUM CHLORIDE 9 MG/ML
INJECTION, SOLUTION INTRAVENOUS CONTINUOUS
Status: DISCONTINUED | OUTPATIENT
Start: 2021-10-29 | End: 2021-10-30

## 2021-10-29 RX ORDER — OXYCODONE HYDROCHLORIDE AND ACETAMINOPHEN 5; 325 MG/1; MG/1
2 TABLET ORAL PRN
Status: DISCONTINUED | OUTPATIENT
Start: 2021-10-29 | End: 2021-10-29 | Stop reason: HOSPADM

## 2021-10-29 RX ORDER — PROMETHAZINE HYDROCHLORIDE 25 MG/ML
6.25 INJECTION, SOLUTION INTRAMUSCULAR; INTRAVENOUS
Status: DISCONTINUED | OUTPATIENT
Start: 2021-10-29 | End: 2021-10-29 | Stop reason: HOSPADM

## 2021-10-29 RX ORDER — SODIUM CHLORIDE 0.9 % (FLUSH) 0.9 %
5-40 SYRINGE (ML) INJECTION EVERY 12 HOURS SCHEDULED
Status: DISCONTINUED | OUTPATIENT
Start: 2021-10-29 | End: 2021-10-30 | Stop reason: HOSPADM

## 2021-10-29 RX ORDER — HYDRALAZINE HYDROCHLORIDE 20 MG/ML
5 INJECTION INTRAMUSCULAR; INTRAVENOUS EVERY 10 MIN PRN
Status: DISCONTINUED | OUTPATIENT
Start: 2021-10-29 | End: 2021-10-29 | Stop reason: HOSPADM

## 2021-10-29 RX ORDER — MEPERIDINE HYDROCHLORIDE 25 MG/ML
12.5 INJECTION INTRAMUSCULAR; INTRAVENOUS; SUBCUTANEOUS EVERY 5 MIN PRN
Status: DISCONTINUED | OUTPATIENT
Start: 2021-10-29 | End: 2021-10-29 | Stop reason: HOSPADM

## 2021-10-29 RX ORDER — KETOROLAC TROMETHAMINE 30 MG/ML
30 INJECTION, SOLUTION INTRAMUSCULAR; INTRAVENOUS EVERY 6 HOURS
Status: DISCONTINUED | OUTPATIENT
Start: 2021-10-29 | End: 2021-10-30 | Stop reason: HOSPADM

## 2021-10-29 RX ORDER — SODIUM CHLORIDE, SODIUM LACTATE, POTASSIUM CHLORIDE, CALCIUM CHLORIDE 600; 310; 30; 20 MG/100ML; MG/100ML; MG/100ML; MG/100ML
INJECTION, SOLUTION INTRAVENOUS CONTINUOUS PRN
Status: DISCONTINUED | OUTPATIENT
Start: 2021-10-29 | End: 2021-10-29 | Stop reason: SDUPTHER

## 2021-10-29 RX ORDER — ONDANSETRON 2 MG/ML
INJECTION INTRAMUSCULAR; INTRAVENOUS PRN
Status: DISCONTINUED | OUTPATIENT
Start: 2021-10-29 | End: 2021-10-29 | Stop reason: SDUPTHER

## 2021-10-29 RX ADMIN — HYDROMORPHONE HYDROCHLORIDE 0.5 MG: 1 INJECTION, SOLUTION INTRAMUSCULAR; INTRAVENOUS; SUBCUTANEOUS at 23:24

## 2021-10-29 RX ADMIN — HYDROMORPHONE HYDROCHLORIDE 0.5 MG: 1 INJECTION, SOLUTION INTRAMUSCULAR; INTRAVENOUS; SUBCUTANEOUS at 13:27

## 2021-10-29 RX ADMIN — HYDROMORPHONE HYDROCHLORIDE 0.5 MG: 1 INJECTION, SOLUTION INTRAMUSCULAR; INTRAVENOUS; SUBCUTANEOUS at 16:46

## 2021-10-29 RX ADMIN — PROPOFOL 200 MG: 10 INJECTION, EMULSION INTRAVENOUS at 14:36

## 2021-10-29 RX ADMIN — HYDROXYZINE PAMOATE 50 MG: 50 CAPSULE ORAL at 18:37

## 2021-10-29 RX ADMIN — DEXAMETHASONE SODIUM PHOSPHATE 4 MG: 4 INJECTION, SOLUTION INTRAMUSCULAR; INTRAVENOUS at 14:45

## 2021-10-29 RX ADMIN — ACETAMINOPHEN 650 MG: 325 TABLET ORAL at 18:35

## 2021-10-29 RX ADMIN — OXYCODONE 5 MG: 5 TABLET ORAL at 18:35

## 2021-10-29 RX ADMIN — FENTANYL CITRATE 100 MCG: 50 INJECTION INTRAMUSCULAR; INTRAVENOUS at 14:49

## 2021-10-29 RX ADMIN — LIDOCAINE HYDROCHLORIDE 50 MG: 20 INJECTION, SOLUTION INFILTRATION; PERINEURAL at 14:36

## 2021-10-29 RX ADMIN — SODIUM CHLORIDE, PRESERVATIVE FREE 10 ML: 5 INJECTION INTRAVENOUS at 10:06

## 2021-10-29 RX ADMIN — HYDROMORPHONE HYDROCHLORIDE 0.5 MG: 1 INJECTION, SOLUTION INTRAMUSCULAR; INTRAVENOUS; SUBCUTANEOUS at 10:03

## 2021-10-29 RX ADMIN — SODIUM CHLORIDE: 9 INJECTION, SOLUTION INTRAVENOUS at 16:47

## 2021-10-29 RX ADMIN — HYDROMORPHONE HYDROCHLORIDE 0.5 MG: 1 INJECTION, SOLUTION INTRAMUSCULAR; INTRAVENOUS; SUBCUTANEOUS at 20:35

## 2021-10-29 RX ADMIN — GABAPENTIN 300 MG: 300 CAPSULE ORAL at 20:41

## 2021-10-29 RX ADMIN — KETOROLAC TROMETHAMINE 30 MG: 30 INJECTION, SOLUTION INTRAMUSCULAR at 14:58

## 2021-10-29 RX ADMIN — DIPHENHYDRAMINE HYDROCHLORIDE 12.5 MG: 50 INJECTION, SOLUTION INTRAMUSCULAR; INTRAVENOUS at 14:58

## 2021-10-29 RX ADMIN — ONDANSETRON HYDROCHLORIDE 4 MG: 2 INJECTION, SOLUTION INTRAMUSCULAR; INTRAVENOUS at 14:45

## 2021-10-29 RX ADMIN — KETOROLAC TROMETHAMINE 30 MG: 30 INJECTION, SOLUTION INTRAMUSCULAR at 22:30

## 2021-10-29 RX ADMIN — SODIUM CHLORIDE, POTASSIUM CHLORIDE, SODIUM LACTATE AND CALCIUM CHLORIDE: 600; 310; 30; 20 INJECTION, SOLUTION INTRAVENOUS at 14:32

## 2021-10-29 RX ADMIN — FENTANYL CITRATE 100 MCG: 50 INJECTION INTRAMUSCULAR; INTRAVENOUS at 14:36

## 2021-10-29 RX ADMIN — VANCOMYCIN HYDROCHLORIDE 1250 MG: 10 INJECTION, POWDER, LYOPHILIZED, FOR SOLUTION INTRAVENOUS at 11:27

## 2021-10-29 RX ADMIN — KETOROLAC TROMETHAMINE 30 MG: 30 INJECTION, SOLUTION INTRAMUSCULAR at 10:03

## 2021-10-29 RX ADMIN — AMPICILLIN SODIUM AND SULBACTAM SODIUM 1500 MG: 1; .5 INJECTION, POWDER, FOR SOLUTION INTRAMUSCULAR; INTRAVENOUS at 00:46

## 2021-10-29 ASSESSMENT — PULMONARY FUNCTION TESTS
PIF_VALUE: 7
PIF_VALUE: 10
PIF_VALUE: 0
PIF_VALUE: 6
PIF_VALUE: 8
PIF_VALUE: 8
PIF_VALUE: 7
PIF_VALUE: 6
PIF_VALUE: 6
PIF_VALUE: 7
PIF_VALUE: 8
PIF_VALUE: 13
PIF_VALUE: 8
PIF_VALUE: 14
PIF_VALUE: 4
PIF_VALUE: 0
PIF_VALUE: 8
PIF_VALUE: 1
PIF_VALUE: 1
PIF_VALUE: 7
PIF_VALUE: 1
PIF_VALUE: 10
PIF_VALUE: 7
PIF_VALUE: 8
PIF_VALUE: 16
PIF_VALUE: 8
PIF_VALUE: 0
PIF_VALUE: 8
PIF_VALUE: 7
PIF_VALUE: 18
PIF_VALUE: 1
PIF_VALUE: 7
PIF_VALUE: 22
PIF_VALUE: 7

## 2021-10-29 ASSESSMENT — PAIN SCALES - GENERAL
PAINLEVEL_OUTOF10: 10
PAINLEVEL_OUTOF10: 8
PAINLEVEL_OUTOF10: 0
PAINLEVEL_OUTOF10: 10
PAINLEVEL_OUTOF10: 8
PAINLEVEL_OUTOF10: 10
PAINLEVEL_OUTOF10: 10
PAINLEVEL_OUTOF10: 8
PAINLEVEL_OUTOF10: 10
PAINLEVEL_OUTOF10: 10

## 2021-10-29 ASSESSMENT — LIFESTYLE VARIABLES: SMOKING_STATUS: 0

## 2021-10-29 NOTE — PROGRESS NOTES
Bedside report given and pt care transferred to [facility]. Pt denies needs at this time. Call light within reach.

## 2021-10-29 NOTE — FLOWSHEET NOTE
10/29/21 0909   Vital Signs   Temp 96.9 °F (36.1 °C)   Temp Source Oral   Pulse 99   Heart Rate Source Monitor   Resp 15   /84   BP Location Left upper arm   Patient Position Right side   Level of Consciousness Alert (0)   MEWS Score 1   Patient Currently in Pain Yes   Oxygen Therapy   SpO2 100 %   O2 Device None (Room air)     Patient admitted to room 203 from ER. Patient oriented to room, call light, bed rails, phone, lights and bathroom. Patient instructed about the schedule of the day including: vital sign frequency, lab draws, possible tests, frequency of MD and staff rounds, daily weights, I &O's and prescribed diet. Bed alarm deferred patient low fall risk and refuses alarm. Bed locked, in lowest position, side rails up 2/4, call light within reach. Recliner Assessment:     Patient is not able to demonstrated the ability to move from a reclining position to an upright position within the recliner. however patient is alert, oriented and able to provide informed consent     Bedside Mobility Assessment Tool (BMAT):     Assessment Level 1- Sit and Shake    1. From a semi-reclined position, ask patient to sit up and rotate to a seated position at the side of the bed. Can use the bedrail. 2. Ask patient to reach out and grab your hand and shake making sure patient reaches across his/her midline. Pass- Patient is able to come to a seated position, maintain core strength. Maintains seated balance while reaching across midline. Move on to Assessment Level 2. Assessment Level 2- Stretch and Point   1. With patient in seated position at the side of the bed, have patient place both feet on the floor (or stool) with knees no higher than hips. 2. Ask patient to stretch one leg and straighten the knee, then bend the ankle/flex and point the toes. If appropriate, repeat with the other leg. Pass- Patient is able to demonstrate appropriate quad strength on intended weight bearing limb(s).  Move onto

## 2021-10-29 NOTE — PLAN OF CARE
Pt sleepy this morning. Understands plan for surgery this afternoon. Consent obtained. Remain NPO  Appreciate IM evaluation and clearance. Discussed with patient the importance of ABX and compliance going forward in order to resolve the current infection.       Shelbi Bello PA-C

## 2021-10-29 NOTE — CONSULTS
Pharmacy Note  Vancomycin Consult    Mee Nascimento is a 27 y.o. male started on Vancomycin for gram positive coverage of left thumb infection; consult received from Dr. Edward Sun to manage therapy. Also receiving the following antibiotics: Unasyn. Patient Active Problem List   Diagnosis    Tenosynovitis    Paronychia of left thumb    Abrasion, hand with infection, left, sequela     Allergies:  Patient has no known allergies. Temp max: 98.2    Recent Labs     10/28/21  1610   BUN 11   CREATININE 0.6*   WBC 10.8       Intake/Output Summary (Last 24 hours) at 10/28/2021 2205  Last data filed at 10/28/2021 2018  Gross per 24 hour   Intake 250 ml   Output --   Net 250 ml     Culture Date      Source                       Results      Ht Readings from Last 1 Encounters:   10/19/21 6' 1\" (1.854 m)        Wt Readings from Last 1 Encounters:   10/28/21 132 lb (59.9 kg)       Body mass index is 17.42 kg/m². Estimated Creatinine Clearance: 161 mL/min (A) (based on SCr of 0.6 mg/dL (L)). Goal AUC Level: 400-600 mcg/mL    Assessment/Plan:  A 1 gram dose of vancomycin was already given in the ED 10/28 @ 1827. Will start Vancomycin 1250 mg IV every 12 hours. Give first dose tonight @ 2300. A vancomycin level has been ordered for 10/29 @ 2200. Predicted value per Insight modeling = 11.1. Insight summary:    Loading dose: 1000 mg x1 10/28 @ 1827  Regimen: 1250 mg IV every 12 hours. Start time: 23:00 on 10/28/2021  Exposure target: AUC24 (range)400-600 mg/L.hr   AUC24,ss: 464 mg/L.hr  Probability of AUC24 > 400: 65 %  Ctrough,ss: 12.1 mg/L  Probability of Ctrough,ss > 20: 19 %  Probability of nephrotoxicity (Lodise ZHENG 2009): 7 %      Thank you for the consult. Will continue to follow.

## 2021-10-29 NOTE — PROGRESS NOTES
Arrived from OR with airway in place and respirations unlabored. Dressing to left finger dry and intact.   No discomfort noted at this time

## 2021-10-29 NOTE — H&P
Hospital Medicine History & Physical      PCP: No primary care provider on file. Date of Admission: 10/28/2021    Date of Service: Pt seen/examined on 10/29/2021     Chief Complaint:    Chief Complaint   Patient presents with    Wound Check     pt c/o worsening L thumb pain. \"surgery\" apprx 1.5 weeks ago and did not follow up. sts original injury was hammer \"blunt force\". did not get ATX. hx hep B & C. no fevers. IV heroin use just PTA. eyes drooping. intermt anxiety then dozing off. resp unlabore. bandage to L hand. pt reports odor / discoloration. +PMS. no streaking noted     History Of Present Illness: The patient is a 27 y.o. male with Hep C, Hep B, IVDA with 1/2 g of heroin daily, recreational methamphetamine use, recent admit to [facility] for left thumb/felon abscess and underwent I&D, subsequently left the hospital AMA. He returned to ER yesterday for worsening swelling of the left thumb, redness, pain, and drainage. He was admitted for further care of soft tissue infection of the right thumb. Past Medical History:        Diagnosis Date    Asthma     Hepatitis B surface antigen positive 5/11/18, 5/1/18, 12/21/2017    Hepatitis C antibody positive in blood 5/11/18, 5/4/18, 12/21/2017    MRSA (methicillin resistant staph aureus) culture positive 10/20/2021    Hand       Past Surgical History:        Procedure Laterality Date    HAND SURGERY Left 10/20/2021    LEFT THUMB INCISION AND DRAINAGE performed by Michelle Dozier MD at [address] [place] [address]         Medications Prior to Admission:    Prior to Admission medications    Not on File       Allergies:  Patient has no known allergies. Social History:  The patient currently lives at home     TOBACCO:   reports that he has quit smoking. His smoking use included cigarettes. He has a 2.50 pack-year smoking history. He has never used smokeless tobacco.  ETOH:   reports no history of alcohol use.       Family History:   Positive as follows:    History reviewed. No pertinent family history. REVIEW OF SYSTEMS:       Constitutional: Negative for fever   HENT: Negative for sore throat   Eyes: Negative for redness   Respiratory: Negative  for dyspnea, cough   Cardiovascular: Negative for chest pain   Gastrointestinal: Negative for vomiting, diarrhea   Genitourinary: Negative for hematuria   Musculoskeletal: Negative for arthralgias   Skin: Negative for rash   + left thumb wound drainage  Neurological: Negative for syncope   Hematological: Negative for adenopathy   Psychiatric/Behavorial: Negative for anxiety    PHYSICAL EXAM:    BP (!) 136/96   Pulse 73   Temp 98.2 °F (36.8 °C)   Resp 18   Wt 132 lb (59.9 kg)   SpO2 100%   BMI 17.42 kg/m²     Gen: No distress. Alert. Facial tattoos   Eyes: PERRL. No sclera icterus. No conjunctival injection. ENT: No discharge. Pharynx clear. Neck: No JVD. No Carotid Bruit. Trachea midline. Resp: No accessory muscle use. No crackles. No wheezes. No rhonchi. CV: Regular rate. Regular rhythm. No murmur. No rub. No edema. Capillary Refill: Brisk,< 3 seconds   Peripheral Pulses: +2 palpable, equal bilaterally   GI: Non-tender. Non-distended. No masses. No organomegaly. Normal bowel sounds. No hernia. Skin: Warm and dry. No nodule on exposed extremities. No rash on exposed extremities. His left thumb has been bandaged, I did not remove the dressing due to patient's complaint of significant pain   M/S: No cyanosis. No joint deformity. No clubbing. Did not examine left thumb    Neuro: Awake. Grossly nonfocal    Psych: Oriented x 3. No anxiety or agitation.      CBC:   Recent Labs     10/28/21  1610   WBC 10.8   HGB 12.3*   HCT 36.7*   MCV 85.9        BMP:   Recent Labs     10/28/21  1610      K 4.3      CO2 24   BUN 11   CREATININE 0.6*     LIVER PROFILE:   Recent Labs     10/28/21  1610   AST 16   ALT 13   BILITOT <0.2   ALKPHOS 87     U/A:    Lab Results   Component Value Date COLORU Yellow 10/20/2021    CLARITYU Clear 10/20/2021    SPECGRAV 1.015 10/20/2021    LEUKOCYTESUR Negative 10/20/2021    BLOODU Negative 10/20/2021    GLUCOSEU Negative 10/20/2021     CULTURES  Blood: Pending     EKG:  I have reviewed the EKG with the following interpretation:   None     RADIOLOGY  XR FINGER LEFT (MIN 2 VIEWS)   Final Result   Soft tissue swelling with soft tissue gas. While that gas may be introduced   via a laceration, a gas-forming infection must be considered, including   necrotizing infections. Questionable nondisplaced fracture of the radial aspect distal phalanx at the   interphalangeal joint. Findings were discussed with Merissa Rodriguez at 4 the:39 pm on 10/28/2021. Pertinent previous results reviewed   Surgical culture 10/20/21  Staph aureus mrsa (1)    Antibiotic Interpretation KENNEDY Status    ceFAZolin Resistant 16 mcg/mL     clindamycin Sensitive <=0.5 mcg/mL     erythromycin Resistant >4 mcg/mL     oxacillin Resistant >2 mcg/mL     tetracycline Sensitive <=4 mcg/mL     trimethoprim-sulfamethoxazole Sensitive <=0.5/9.5 mcg/mL     vancomycin Sensitive 2 mcg/mL         ASSESSMENT/PLAN:    #Left thumb abscess/felon 2/2 MRSA  #? Open fracture - questionable nondisplaced fracture of the radial aspect of the distal phalanx at the interphalangeal joint  - sp I&D at [facility] on 10/20/21, subsequently left AMA without antibiotics   - returned to ER with concern for worsening infection- swelling, pain, drainage   - XR revealed gas could be from lac, but cannot r/o gas forming infection- see above  - vanco D#1  - pain control with scheduled Toradol, PRN dilaudid, PRN acetaminophen. His pain will be difficult to control given his substance abuse history   - ortho c/s- to OR today for I&D.   Medically cleared for procedure     #IVDA with heroin and methamphetamine  - he uses 1/2 g of heroin daily  - he occasionally uses methamphetamine  - recommended cessation, discussed difficulties with pain control in this situation  - CM c/s for rehab list     #Hepatitis C   #Hepatitis B  - per chart review  - no evidence of liver failure     #Tobacco Dependence  -Recommended cessation  - nicotine patch declined by pt     DVT Prophylaxis: Lovenox   Diet: Diet NPO  Code Status: Full Code    Karla Lea PA-C  10/29/2021 8:59 AM

## 2021-10-29 NOTE — ANESTHESIA PRE PROCEDURE
Department of Anesthesiology  Preprocedure Note       Name:  Zander Cerna   Age:  [address] y. o.  :  1995                                          MRN:  [id number]         Date:  10/29/2021      Surgeon: Nam Wayne MD    Procedure:  LEFT FINGER RECURRENT INCISION AND DRAINAGE    HPI:  The patient is a 79-year-old right hand dominant male, who presented to [facility] ER with the complaints ofleft thumb pain and swelling. He does have a history of IV drug abuse and polysubstance abuse. He has a c/o worsening L thumb pain. \"surgery\" apprx 1.5 weeks ago but did not follow up. He sts original injury was hammer \"blunt force\". H/O Hep B & C. [address] y.o. male with Hep C, Hep B, and IVDA with 1/2 g of heroin daily, recreational methamphetamine use, recent admit to [facility] for left thumb/felon abscess and underwent I&D, subsequently left the hospital AMA. He returned to ER  for worsening swelling of the left thumb, redness, pain, and drainage. EK-OCT-2021 Normal sinus rhythm; Incomplete right bundle branch block; T wave abnormality, consider anterior ischemia;  Abnormal ECG    Medications prior to admission:   Prior to Admission medications    Not on File     Current medications:     influenza quadrivalent split vaccine (FLUZONE;FLUARIX;FLULAVAL;AFLURIA) injection 0.5 mL  0.5 mL IntraMUSCular Prior to discharge    HYDROmorphone (DILAUDID) injection 0.25 mg  0.25 mg IntraVENous Q3H PRN       HYDROmorphone (DILAUDID) injection 0.5 mg  0.5 mg IntraVENous Q3H PRN    ketorolac (TORADOL) injection 30 mg  30 mg IntraVENous Q6H    hydrOXYzine (VISTARIL) capsule 50 mg  50 mg Oral Q8H PRN    promethazine (PHENERGAN) tablet 25 mg  25 mg Oral Q6H PRN    gabapentin (NEURONTIN) capsule 300 mg  300 mg Oral Q8H PRN    ondansetron (ZOFRAN-ODT) disintegrating tablet 4 mg  4 mg Oral Q8H PRN       ondansetron (ZOFRAN) injection 4 mg  4 mg IntraVENous Q6H PRN    polyethylene glycol (GLYCOLAX) packet 17 g  17 g Oral Daily PRN    acetaminophen (TYLENOL) tablet 650 mg  650 mg Oral Q6H PRN       acetaminophen (TYLENOL) suppository 650 mg  650 mg Rectal Q6H PRN    ketorolac (TORADOL) injection 30 mg  30 mg IntraVENous Q6H PRN    vancomycin (VANCOCIN) 1,250 mg in dextrose 5 % 250 mL IVPB  1,250 mg IntraVENous Q12H     Allergies:  No Known Allergies    Problem List:     Tenosynovitis M65.9    Paronychia of left thumb L03.012    Abrasion, hand with infection, left, sequela S60.512S, L08.9     Past Medical History:     Asthma     Hepatitis B surface antigen positive 5/11/18, 5/1/18, 12/21/2017    Hepatitis C antibody positive in blood 5/11/18, 5/4/18, 12/21/2017    MRSA (methicillin resistant staph aureus) culture positive 10/20/2021    Hand     Past Surgical History:     HAND SURGERY Left 10/20/2021    LEFT THUMB INCISION AND DRAINAGE performed by Giancarlo Fajardo MD at [address] [place] [address]       Social History:     Smoking status: Former Smoker     Packs/day: 0.50     Years: 5.00     Pack years: 2.50     Types: Cigarettes    Smokeless tobacco: Never Used   Substance Use Topics    Alcohol use: No     Comment: occasional                                Counseling given: Not Answered    Vital Signs (Current):       10/29/21 0700 10/29/21 0701 10/29/21 0909 10/29/21 1215   BP:  (!) 136/96 130/84 122/77   Pulse:   99 85   Resp:   15 16   Temp:   96.9 °F (36.1 °C) 98.4 °F (36.9 °C)   TempSrc:   Oral Oral   SpO2: 99% 100% 100% 99%              BP Readings from Last 3 Encounters:   10/29/21 122/77   10/20/21 (!) 160/93   10/20/21 (!) 105/50     NPO Status: >8 hrs                      BMI:   Wt Readings from Last 3 Encounters:   10/28/21 132 lb (59.9 kg)   10/19/21 155 lb (70.3 kg)   07/13/21 155 lb (70.3 kg)     Body mass index is 17.42 kg/m².     CBC:    WBC 10.8 10/28/2021    HGB 12.3 10/28/2021    HCT 36.7 10/28/2021     10/28/2021     CMP:     10/28/2021    K 4.3 10/28/2021     10/28/2021    CO2 24 10/28/2021    BUN 11 10/28/2021    CREATININE 0.6 10/28/2021    GFRAA >60 10/28/2021    GLUCOSE 100 10/28/2021    PROT 7.1 10/28/2021    PROT 6.8 12/15/2011    CALCIUM 9.1 10/28/2021    BILITOT <0.2 10/28/2021    ALKPHOS 87 10/28/2021    AST 16 10/28/2021    ALT 13 10/28/2021     COVID-19 Screening (If Applicable): COVID19 Not Detected 10/28/2021     Anesthesia Evaluation  Patient summary reviewed and Nursing notes reviewed  Airway: Mallampati: II  TM distance: >3 FB   Neck ROM: full  Mouth opening: > = 3 FB Dental:          Pulmonary: breath sounds clear to auscultation  (+) asthma:     (-) recent URI, wheezes and not a current smoker                           Cardiovascular:  Exercise tolerance: good (>4 METS),       (-) hypertension, dysrhythmias and  WAN      Rhythm: regular  Rate: normal                    Neuro/Psych:   (+) psychiatric history:   (-) seizures, TIA and CVA            ROS comment: Polysubstance abuse GI/Hepatic/Renal:   (+) hepatitis: C and B, liver disease:,      (-) no renal disease       Endo/Other:        (-) diabetes mellitus               Abdominal:             Vascular: Other Findings:           Anesthesia Plan      general     ASA 2       Induction: intravenous. MIPS: Prophylactic antiemetics administered. Anesthetic plan and risks discussed with patient. Plan discussed with CRNA.           Robina Calle MD

## 2021-10-29 NOTE — ANESTHESIA POSTPROCEDURE EVALUATION
Department of Anesthesiology  Postprocedure Note    Patient: Kim Napier  MRN: [id number]  YOB: 1995  Date of evaluation: 10/29/2021    Procedure Summary     Date: 10/29/21 Room / Location: [address] [place] [address] / [facility]    Anesthesia Start: 4519 Anesthesia Stop: 3995    Procedure: LEFT FINGER RECURRENT INCISION AND DRAINAGE (Left Thumb) Diagnosis: (LEFT FINGER ABSCESS)    Surgeons: Mary Powell MD Responsible Provider: Erica Trevizo MD    Anesthesia Type: general ASA Status: 2        Anesthesia Type: general    Gerard Phase I: Gerard Score: 10    Gerard Phase II:      Last vitals: Reviewed and per EMR flowsheets.      Anesthesia Post Evaluation   Anesthetic Problems: no   Cardiovascular System Stable: yes  Respiratory Function: Airway Patent yes  ETT no  Ventilator no  Level of consciousness: awake, alert and oriented  Post-op pain: adequate analgesia  Hydration Adequate: yes  Nausea/Vomiting:no  Other Issues:     Natalya Mcclain MD

## 2021-10-29 NOTE — BRIEF OP NOTE
Brief Postoperative Note      Patient: Cesar Goldman  YOB: 1995  MRN: [id number]    Date of Procedure: 10/29/2021    Pre-Op Diagnosis: LEFT THUMB ABSCESS    Post-Op Diagnosis: Same       Procedure(s):  LEFT THUMB REPEAT INCISION AND DRAINAGE    Surgeon(s):  Gerard Lomsa MD    Assistant:  Surgical Assistant: Nneka Hebert    Anesthesia: General    Estimated Blood Loss (mL): Minimal    Complications: None    Specimens:   ID Type Source Tests Collected by Time Destination   1 : LEFT THUMB CULTURE Body Fluid Fluid CULTURE, ANAEROBIC, CULTURE, BODY FLUID Gerard Lomas MD 10/29/2021 1501        Implants:  * No implants in log *      Drains: * No LDAs found *    Findings: purulent fluid, necrotic skin and subcutaneous tissue      Electronically signed by Gerard Lomas MD on 10/29/2021 at 3:07 PM

## 2021-10-29 NOTE — PLAN OF CARE
Problem: Pain:  Description: Pain management should include both nonpharmacologic and pharmacologic interventions.   Goal: Pain level will decrease  Description: Pain level will decrease  Outcome: Ongoing  Goal: Control of acute pain  Description: Control of acute pain  Outcome: Ongoing  Goal: Control of chronic pain  Description: Control of chronic pain  Outcome: Ongoing  Goal: Patient's pain/discomfort is manageable  Description: Patient's pain/discomfort is manageable  Outcome: Ongoing     Problem: Safety:  Goal: Free from accidental physical injury  Description: Free from accidental physical injury  Outcome: Ongoing

## 2021-10-29 NOTE — CARE COORDINATION
Case Management Assessment  Initial Evaluation      Patient Name: Isabel Cerda  YOB: 1995  Diagnosis: Non compliance with medical treatment [Z91.19]  Abrasion, hand with infection, left, sequela [S60.512S, L08.9]  Felon of finger [R11.090]  Date / Time: 10/28/2021  3:30 PM    Admission status/Date:  10/28/2021  Chart Reviewed: Yes      Patient Interviewed: Yes   Family Interviewed:  No - number not in service    Hospitalization in the last 30 days:  Yes      Health Care Decision Maker :   Primary Decision Maker: Brayan Almeida  [phone]    (CM - must 1st enter selection under Navigator - emergency contact- [place] Relationship and pick relationship)   Who do you trust or have selected to make healthcare decisions for you      Current PCP: needs referal    Mikel Duenas required for SNF : YES      3 night stay required - NA    ADLS  Support Systems/Care Needs:    Transportation: Taxi    Meal Preparation: self    Housing  Living Arrangements: w/grandmother  Steps: NA  Intent for return to present living arrangements: Yes  Identified Issues    Home O2 Use :  No      Community Service Affiliation  Dialysis:  No    · Agency:  · Location:  · Dialysis Schedule:  · Phone:   · Fax: Other Community Services:  Resource packet provided     DISCHARGE PLAN: Explained Case Management role/services. Chart reviewed. Met with pt at bedside and explained the role of the CM. Plans to return home. Denies any needs however did not get his RX filled after last admission and resulting in return to hospital for I&D and IVABTX. Not a candidate for OP IVABTX due to h/o IVDU. Andrew rosenberg DC over the weekend on oral abtx.  +CM following

## 2021-10-29 NOTE — PLAN OF CARE
Full consult note to follow    Xrays reviewed. Chano Castellano is a 27 y.o. male with history of IV drug abuse and hepatitis C, who presented to [facility] ER for left thumb \"wound check. \"  He underwent left thumb incision and drainage for felon by me on 10/20/2021. Patient left AMA that night of surgery, stating that he needed to go use drugs. He has not been on any antibiotics. Surgical culture at that time grew MRSA. Plan:  --Repeat incision and drainage today when OR available. --NPO  --IV antibiotics.          Electronically signed by Pérez Hunt MD on 10/29/2021 at 7:03 AM

## 2021-10-29 NOTE — OP NOTE
[address]                 [address] [facility], [place], [facility], [address]                                OPERATIVE REPORT    PATIENT NAME: Amanda Park                     :        1995  MED REC NO:   [id number]                          ROOM:       0203  ACCOUNT NO:   [de-identified]                           ADMIT DATE: 10/28/2021  PROVIDER:     Harshil Collazo MD    DATE OF PROCEDURE:  10/29/2021    PREOPERATIVE DIAGNOSIS:  Left thumb abscess. POSTOPERATIVE DIAGNOSIS:  Left thumb abscess. OPERATION PERFORMED:  Left thumb repeat incision and drainage. SURGEON:  Harshil Collazo MD    ASSISTANT:  Heather Champion    ANESTHESIA:  General.    EBL:  Minimal.    COMPLICATIONS:  None. SPECIMENS:  Left thumb abscess fluid for culture. DISPOSITION:  To PACU in good condition. INDICATIONS FOR PROCEDURE:  The patient is a 82-year-old right-hand  dominant gentleman with a history of IV drug abuse and hepatitis C, who  underwent a left thumb incision and drainage by me on 10/20/2021 at  [facility].  He left AMA that night and has not been on  antibiotics and he left his packing in for over three days and then  stated that he started doing some dilute peroxide soaks. He presented  back to [facility] emergency room on 10/28/2021 with  complaints of continued left thumb pain and swelling. He was admitted  to the hospital for further treatment. Orthopedic consultation was  requested. Of note, he did grow MRSA from the previous incision and  drainage. We discussed the risks, benefits, complications, and  alternatives of the procedure after recommending repeat left thumb  incision and drainage. He subsequently provided written informed  consent for the procedure. OPERATIVE PROCEDURE:  The patient was seen in the preoperative holding  area. His left thumb was marked. He was seen by the Anesthesia  Service.   He was then brought to the operating room.  He was transferred  onto operating room table in supine position. He was induced under  general anesthesia. He had been receiving intravenous vancomycin since  admission. He had DVT prophylaxis with sequential compression devices  on his bilateral lower extremities. A well-padded tourniquet was placed  on his left proximal arm. His left arm was then sterilely prepped and  draped in the usual fashion. A time-out was taken where the patient,  the operating extremity, and the operative procedure were once again  verified. I then used a hemostat and spread at the site of the previous  incision. Once I spread at the incision site, we were able to encounter  purulent fluid. Approximately 5 mL of purulent fluid was able to be  expressed from around the thumb pulp. This was swabbed for culture. We  then copiously irrigated the wound with normal saline solution. He did  have some necrotic skin at the skin edges and this was sharply excised  and debrided. He did have some purulent fibrinous tissue within the  subcutaneous tissue and this was also excisionally debrided. We  copiously irrigated once more, which then demonstrated more healthy  looking tissue. We then packed the wound with iodoform gauze. This was  then wrapped with dry sterile dressing. The patient was then awoken  from anesthesia and transferred back onto the hospital cart, and  transferred to PACU for recovery. He tolerated the procedure well  without any complications. PLAN:  The patient will be recovered in the PACU, then be admitted to  the floor. We will continue him on the vancomycin at this time and plan  to hopefully try to switch to p.o. antibiotics for discharge. He will  begin three times daily warm and soapy water hand soaks starting  tomorrow morning and continue wet to dry packing for the wound.         Tiffany Benton MD    D: 10/29/2021 15:13:17       T: 10/29/2021 16:24:52     /[email or web address]  Job#: [id number]     Doc#: [id number]    CC:

## 2021-10-29 NOTE — ED NOTES
Pt not in room, this RN went to locate patient and pt he was walking back into the ER from the outside, pt advised he can not go out and smoke and due to his history can not go out with an IV. Pt advised this RN spoke to his grandmother and she is very worried about him.  Pt verbalized understanding of requirements     Katy Licea RN  10/28/21 2050 8

## 2021-10-29 NOTE — CONSULTS
Orthopaedic Surgery Consult Note      Reason for consult:  Left thumb abscess/felon    Requesting physician: Ramírez Bolton MD and Ofe Walters MD    CHIEF COMPLAINT: Left thumb pain / infection    HISTORY:  Mr. Franklin Delcid is a 27 y.o. right handed male, with history of IVDA and hepatitis C,   who presented to [facility] ER for left thumb \"wound check. \"  He underwent left thumb incision and drainage for felon by me on 10/20/2021. Patient left AMA that night of surgery, stating that he needed to go use drugs. Surgical culture at that time grew MRSA. He states he intended to come back to hospital.   He has not been on any antibiotics. He states packing was left in for 3 days. He then removed it and has performed some soaks with diluted peroxide solution.         Past Medical History:   Diagnosis Date    Asthma     Hepatitis B surface antigen positive 5/11/18, 5/1/18, 12/21/2017    Hepatitis C antibody positive in blood 5/11/18, 5/4/18, 12/21/2017    MRSA (methicillin resistant staph aureus) culture positive 10/20/2021    Hand       Past Surgical History:   Procedure Laterality Date    HAND SURGERY Left 10/20/2021    LEFT THUMB INCISION AND DRAINAGE performed by Allison Nascimento MD at [address] [place] [address]         Current Facility-Administered Medications   Medication Dose Route Frequency Provider Last Rate Last Admin    influenza quadrivalent split vaccine (FLUZONE;FLUARIX;FLULAVAL;AFLURIA) injection 0.5 mL  0.5 mL IntraMUSCular Prior to discharge Ofe Walters MD        HYDROmorphone (DILAUDID) injection 0.25 mg  0.25 mg IntraVENous Q3H PRN Gloria Funk PA-C        Or    HYDROmorphone (DILAUDID) injection 0.5 mg  0.5 mg IntraVENous Q3H PRN Gloria Funk PA-C   0.5 mg at 10/29/21 1327    ketorolac (TORADOL) injection 30 mg  30 mg IntraVENous Q6H Tonya Mitchell PA-C   30 mg at 10/29/21 1003    hydrOXYzine (VISTARIL) capsule 50 mg  50 mg Oral Q8H PRN Shira Telma, APRN - CNP   50 mg at 10/28/21 2118    promethazine (PHENERGAN) tablet 25 mg  25 mg Oral Q6H PRN Shira Telma, APRN - CNP   25 mg at 10/28/21 1840    gabapentin (NEURONTIN) capsule 300 mg  300 mg Oral Q8H PRN Shira Telma, APRN - CNP   300 mg at 10/28/21 2118    sodium chloride flush 0.9 % injection 5-40 mL  5-40 mL IntraVENous 2 times per day Yonis Willett MD   10 mL at 10/29/21 1006    sodium chloride flush 0.9 % injection 5-40 mL  5-40 mL IntraVENous PRN Yonis Willett MD        0.9 % sodium chloride infusion  25 mL IntraVENous PRN Yonis Willett MD        ondansetron (ZOFRAN-ODT) disintegrating tablet 4 mg  4 mg Oral Q8H PRN Yonis Willett MD        Or    ondansetron (ZOFRAN) injection 4 mg  4 mg IntraVENous Q6H PRN Yonis Willett MD        polyethylene glycol (GLYCOLAX) packet 17 g  17 g Oral Daily PRN Yonis Willett MD        acetaminophen (TYLENOL) tablet 650 mg  650 mg Oral Q6H PRN Yonis Willett MD        Or    acetaminophen (TYLENOL) suppository 650 mg  650 mg Rectal Q6H PRN Yonis Willett MD        ketorolac (TORADOL) injection 30 mg  30 mg IntraVENous Q6H PRN Yonis Willett MD   30 mg at 10/28/21 2118    vancomycin (VANCOCIN) 1,250 mg in dextrose 5 % 250 mL IVPB  1,250 mg IntraVENous Q12H Yonis Willett MD   Stopped at 10/29/21 1328       No Known Allergies    Social History     Socioeconomic History    Marital status: Single     Spouse name: Not on file    Number of children: Not on file    Years of education: Not on file    Highest education level: Not on file   Occupational History    Not on file   Tobacco Use    Smoking status: Former Smoker     Packs/day: 0.50     Years: 5.00     Pack years: 2.50     Types: Cigarettes    Smokeless tobacco: Never Used Vaping Use    Vaping Use: Some days    Substances: CBD    Devices: Pre-filled or refillable cartridge   Substance and Sexual Activity    Alcohol use: No     Comment: occasional    Drug use: Yes     Types: Marijuana, Opiates , IV     Comment: Clean x 2 weeks    Sexual activity: Yes     Partners: Female   Other Topics Concern    Not on file   Social History Narrative    Not on file     Social Determinants of Health     Financial Resource Strain:     Difficulty of Paying Living Expenses:    Food Insecurity:     Worried About Running Out of Food in the Last Year:     Ran Out of Food in the Last Year:    Transportation Needs:     Lack of Transportation (Medical):  Lack of Transportation (Non-Medical):    Physical Activity:     Days of Exercise per Week:     Minutes of Exercise per Session:    Stress:     Feeling of Stress :    Social Connections:     Frequency of Communication with Friends and Family:     Frequency of Social Gatherings with Friends and Family:     Attends Jain Services:     Active Member of Clubs or Organizations:     Attends Club or Organization Meetings:     Marital Status:    Intimate Partner Violence:     Fear of Current or Ex-Partner:     Emotionally Abused:     Physically Abused:     Sexually Abused:        History reviewed. No pertinent family history. Review of Systems:   General: negative  Psychological: negative  Ophthalmic: negative  ENT: negative  Hematological and Lymphatic: negative  Endocrine: negative  Respiratory: negative  Cardiovascular: negative  Gastrointestinal: negative  Genito-Urinary: negative  Musculoskeletal: negative. See HPI for pertinent positives. Neurological: negative  Dermatological: negative       PHYSICAL EXAM:  Mr. Sylwia Kerr is a 27 y.o. male who presents today in no acute distress, awake, alert, and oriented.     /77   Pulse 85   Temp 98.4 °F (36.9 °C) (Oral)   Resp 16   Wt 132 lb (59.9 kg)   SpO2 99%   BMI 17.42 kg/m² On evaluation of his left upper extremity, with previous open incision site. Thumb swelling as seen above. CBC:   Lab Results   Component Value Date    WBC 10.8 10/28/2021    RBC 4.27 10/28/2021    HGB 12.3 10/28/2021    HCT 36.7 10/28/2021    MCV 85.9 10/28/2021    MCH 28.7 10/28/2021    MCHC 33.5 10/28/2021    RDW 13.0 10/28/2021     10/28/2021    MPV 6.6 10/28/2021       PT/INR:  No results found for: PROTIME, INR    Chem Basic:   Lab Results   Component Value Date     10/28/2021    K 4.3 10/28/2021     10/28/2021    CO2 24 10/28/2021    GLUCOSE 100 10/28/2021    BUN 11 10/28/2021    CREATININE 0.6 10/28/2021            IMPRESSION:    Left thumb abscess / felon  Noncompliance   IV drug abuse  Hepatiis C      PLAN:  Patient noncompliant with medical recommendations. Left hospital AMA the night of surgery to go use drugs. He had MRSA infection. He has not been on antibiotics. Packing left in place for several days. I had an extensive discussion with Mr. Ozuna Age and/or family regarding the natural history, etiology, and long term consequences of his condition. I have presented reasonable alternatives to the patient's proposed care, treatment, and services. I have outlined a treatment plan with them and, in my opinion, surgical intervention is indicated at this time. Discussion I have had encompassed risks, benefits, and side effects related to the alternatives and the risks related to not receiving the proposed care, treatment, and services. I have discussed the potential complications (including, but not limited to injury to nerve or blood vessel, infection, bleeding, DVT or PE, stiffness, incomplete pain relief, need for further surgery, and anesthetic complications), limitations, expectations, alternatives, and risks of the surgical procedure. We also discussed the importance of postoperative rehabilitation.   He has had full opportunity to ask his questions. I have answered them all to his satisfaction. I feel that Mr. Elsa Bahena understands our discussion today and he will provide written informed consent for the procedure. Plan for repeat left thumb incision and drainage. I had an extensive discussion with the patient regarding compliance with medical recommendations for treatment of his thumb infection. Discussed that, the worst case scenario, continued or worsening infection could result in loss of the digit. Janine Kaur. Zaynab De Luna MD  Orthopaedic Surgery and Sports Medicine     Disclaimer: This note was generated with use of a verbal recognition program and an attempt was made to check for errors. It is possible that there are still dictated errors within this office note. If so, please bring any significant errors to my attention for an addendum. All efforts were made to ensure that this office note is accurate.

## 2021-10-30 VITALS
DIASTOLIC BLOOD PRESSURE: 77 MMHG | HEIGHT: 73 IN | OXYGEN SATURATION: 99 % | TEMPERATURE: 98 F | BODY MASS INDEX: 17.49 KG/M2 | WEIGHT: 132 LBS | RESPIRATION RATE: 18 BRPM | SYSTOLIC BLOOD PRESSURE: 127 MMHG | HEART RATE: 89 BPM

## 2021-10-30 LAB
ANION GAP SERPL CALCULATED.3IONS-SCNC: 10 MMOL/L (ref 3–16)
BASOPHILS ABSOLUTE: 0 K/UL (ref 0–0.2)
BASOPHILS RELATIVE PERCENT: 0.3 %
BUN BLDV-MCNC: 11 MG/DL (ref 7–20)
CALCIUM SERPL-MCNC: 8.6 MG/DL (ref 8.3–10.6)
CHLORIDE BLD-SCNC: 99 MMOL/L (ref 99–110)
CO2: 24 MMOL/L (ref 21–32)
CREAT SERPL-MCNC: 0.6 MG/DL (ref 0.9–1.3)
EOSINOPHILS ABSOLUTE: 0.1 K/UL (ref 0–0.6)
EOSINOPHILS RELATIVE PERCENT: 0.5 %
GFR AFRICAN AMERICAN: >60
GFR NON-AFRICAN AMERICAN: >60
GLUCOSE BLD-MCNC: 117 MG/DL (ref 70–99)
HCT VFR BLD CALC: 36.7 % (ref 40.5–52.5)
HEMOGLOBIN: 12.1 G/DL (ref 13.5–17.5)
LYMPHOCYTES ABSOLUTE: 2.4 K/UL (ref 1–5.1)
LYMPHOCYTES RELATIVE PERCENT: 18.8 %
MAGNESIUM: 2.3 MG/DL (ref 1.8–2.4)
MCH RBC QN AUTO: 28.5 PG (ref 26–34)
MCHC RBC AUTO-ENTMCNC: 33.1 G/DL (ref 31–36)
MCV RBC AUTO: 86.3 FL (ref 80–100)
MONOCYTES ABSOLUTE: 0.7 K/UL (ref 0–1.3)
MONOCYTES RELATIVE PERCENT: 5.7 %
NEUTROPHILS ABSOLUTE: 9.5 K/UL (ref 1.7–7.7)
NEUTROPHILS RELATIVE PERCENT: 74.7 %
PDW BLD-RTO: 12.9 % (ref 12.4–15.4)
PLATELET # BLD: 368 K/UL (ref 135–450)
PMV BLD AUTO: 6.6 FL (ref 5–10.5)
POTASSIUM REFLEX MAGNESIUM: 3.5 MMOL/L (ref 3.5–5.1)
RBC # BLD: 4.26 M/UL (ref 4.2–5.9)
SODIUM BLD-SCNC: 133 MMOL/L (ref 136–145)
WBC # BLD: 12.7 K/UL (ref 4–11)

## 2021-10-30 PROCEDURE — 80048 BASIC METABOLIC PNL TOTAL CA: CPT

## 2021-10-30 PROCEDURE — 36415 COLL VENOUS BLD VENIPUNCTURE: CPT

## 2021-10-30 PROCEDURE — 83735 ASSAY OF MAGNESIUM: CPT

## 2021-10-30 PROCEDURE — 6370000000 HC RX 637 (ALT 250 FOR IP): Performed by: ORTHOPAEDIC SURGERY

## 2021-10-30 PROCEDURE — 2580000003 HC RX 258: Performed by: ORTHOPAEDIC SURGERY

## 2021-10-30 PROCEDURE — 99238 HOSP IP/OBS DSCHRG MGMT 30/<: CPT | Performed by: INTERNAL MEDICINE

## 2021-10-30 PROCEDURE — 6360000002 HC RX W HCPCS: Performed by: ORTHOPAEDIC SURGERY

## 2021-10-30 PROCEDURE — 85025 COMPLETE CBC W/AUTO DIFF WBC: CPT

## 2021-10-30 PROCEDURE — 6370000000 HC RX 637 (ALT 250 FOR IP): Performed by: INTERNAL MEDICINE

## 2021-10-30 RX ORDER — TRAMADOL HYDROCHLORIDE 50 MG/1
50 TABLET ORAL EVERY 6 HOURS PRN
Qty: 15 TABLET | Refills: 0 | Status: SHIPPED | OUTPATIENT
Start: 2021-10-30 | End: 2021-11-04

## 2021-10-30 RX ORDER — CLINDAMYCIN HYDROCHLORIDE 300 MG/1
300 CAPSULE ORAL 3 TIMES DAILY
Qty: 30 CAPSULE | Refills: 0 | Status: SHIPPED | OUTPATIENT
Start: 2021-10-30 | End: 2021-11-09

## 2021-10-30 RX ORDER — SULFAMETHOXAZOLE AND TRIMETHOPRIM 800; 160 MG/1; MG/1
1 TABLET ORAL 2 TIMES DAILY
Qty: 20 TABLET | Refills: 0 | Status: SHIPPED | OUTPATIENT
Start: 2021-10-30 | End: 2021-11-09

## 2021-10-30 RX ORDER — KETOROLAC TROMETHAMINE 10 MG/1
10 TABLET, FILM COATED ORAL EVERY 6 HOURS PRN
Qty: 20 TABLET | Refills: 0 | Status: SHIPPED | OUTPATIENT
Start: 2021-10-30 | End: 2022-10-30

## 2021-10-30 RX ADMIN — HYDROMORPHONE HYDROCHLORIDE 0.5 MG: 1 INJECTION, SOLUTION INTRAMUSCULAR; INTRAVENOUS; SUBCUTANEOUS at 09:39

## 2021-10-30 RX ADMIN — SODIUM CHLORIDE: 9 INJECTION, SOLUTION INTRAVENOUS at 12:45

## 2021-10-30 RX ADMIN — HYDROMORPHONE HYDROCHLORIDE 0.5 MG: 1 INJECTION, SOLUTION INTRAMUSCULAR; INTRAVENOUS; SUBCUTANEOUS at 02:37

## 2021-10-30 RX ADMIN — SODIUM CHLORIDE 25 ML: 9 INJECTION, SOLUTION INTRAVENOUS at 10:25

## 2021-10-30 RX ADMIN — OXYCODONE 5 MG: 5 TABLET ORAL at 00:33

## 2021-10-30 RX ADMIN — GABAPENTIN 300 MG: 300 CAPSULE ORAL at 08:28

## 2021-10-30 RX ADMIN — HYDROXYZINE PAMOATE 50 MG: 50 CAPSULE ORAL at 02:37

## 2021-10-30 RX ADMIN — HYDROMORPHONE HYDROCHLORIDE 0.5 MG: 1 INJECTION, SOLUTION INTRAMUSCULAR; INTRAVENOUS; SUBCUTANEOUS at 12:52

## 2021-10-30 RX ADMIN — SODIUM CHLORIDE, PRESERVATIVE FREE 10 ML: 5 INJECTION INTRAVENOUS at 08:31

## 2021-10-30 RX ADMIN — KETOROLAC TROMETHAMINE 30 MG: 30 INJECTION, SOLUTION INTRAMUSCULAR at 09:39

## 2021-10-30 RX ADMIN — OXYCODONE 5 MG: 5 TABLET ORAL at 08:35

## 2021-10-30 RX ADMIN — ENOXAPARIN SODIUM 40 MG: 40 INJECTION SUBCUTANEOUS at 08:31

## 2021-10-30 RX ADMIN — KETOROLAC TROMETHAMINE 30 MG: 30 INJECTION, SOLUTION INTRAMUSCULAR at 04:36

## 2021-10-30 ASSESSMENT — PAIN DESCRIPTION - FREQUENCY
FREQUENCY: INTERMITTENT
FREQUENCY: INTERMITTENT

## 2021-10-30 ASSESSMENT — PAIN - FUNCTIONAL ASSESSMENT
PAIN_FUNCTIONAL_ASSESSMENT: ACTIVITIES ARE NOT PREVENTED
PAIN_FUNCTIONAL_ASSESSMENT: ACTIVITIES ARE NOT PREVENTED

## 2021-10-30 ASSESSMENT — PAIN DESCRIPTION - PROGRESSION
CLINICAL_PROGRESSION: NOT CHANGED
CLINICAL_PROGRESSION: NOT CHANGED

## 2021-10-30 ASSESSMENT — PAIN DESCRIPTION - PAIN TYPE
TYPE: ACUTE PAIN;SURGICAL PAIN
TYPE: ACUTE PAIN;SURGICAL PAIN

## 2021-10-30 ASSESSMENT — PAIN DESCRIPTION - ONSET
ONSET: AWAKENED FROM SLEEP
ONSET: AWAKENED FROM SLEEP

## 2021-10-30 ASSESSMENT — PAIN SCALES - GENERAL
PAINLEVEL_OUTOF10: 10
PAINLEVEL_OUTOF10: 0
PAINLEVEL_OUTOF10: 9
PAINLEVEL_OUTOF10: 10
PAINLEVEL_OUTOF10: 9
PAINLEVEL_OUTOF10: 10
PAINLEVEL_OUTOF10: 6
PAINLEVEL_OUTOF10: 10
PAINLEVEL_OUTOF10: 0

## 2021-10-30 ASSESSMENT — PAIN DESCRIPTION - DESCRIPTORS
DESCRIPTORS: ACHING
DESCRIPTORS: ACHING

## 2021-10-30 ASSESSMENT — PAIN DESCRIPTION - LOCATION
LOCATION: FINGER (COMMENT WHICH ONE)
LOCATION: FINGER (COMMENT WHICH ONE)

## 2021-10-30 NOTE — PROGRESS NOTES
Left thumb soapy water soak x 10 minutes as ordered. Wet to dry dressage applied. Small amount or serosanguinous drng on old drsg, no odor. Pt resting in bed quietly at this time. Denies any needs. All needs and call light within reach.

## 2021-10-30 NOTE — FLOWSHEET NOTE
10/30/21 0202   Vital Signs   Temp 98.2 °F (36.8 °C)   Temp Source Oral   Pulse 87   Heart Rate Source Monitor   Resp 18   /83   BP Location Left upper arm   Patient Position Supine   Level of Consciousness Alert (0)   MEWS Score 1   Oxygen Therapy   SpO2 98 %   O2 Device None (Room air)   Pt c/o left hand pain. Dilaudid 0.5 mg IV given.  Victoriano Caldwell RN

## 2021-10-30 NOTE — CARE COORDINATION
DISCHARGE ORDER  Date/Time 10/30/2021 2:24 PM  Completed by: Damaris Rojas RN, Case Management    Patient Name: Shari Carbajal      : 1995  Admitting Diagnosis: Non compliance with medical treatment [Z91.19]  Abrasion, hand with infection, left, sequela [S60.512S, L08.9]  Felon of finger [H95.245]      Admit order Date and Status:inpt  (verify MD's last order for status of admission)      Noted discharge order. If applicable PT/OT recommendation at Discharge: na  DME recommendation by PT/OT:na  Confirmed discharge plan  (pt):     Discharge Plan: Reviewed chart. Pt denies needs. Plan continues home on PO abx. Resource folder provided. Cab voucher provided. Reviewed chart. Role of discharge planner explained and patient verbalized understanding. Discharge order is noted. Has Home O2 in place on admit:  No  Informed of need to bring portable home O2 tank on day of discharge for nursing to connect prior to leaving:   Not Indicated  Verbalized agreement/Understanding:   Not Indicated  Pt is being d/c'd to home today. Pt's O2 sats are 99% on RA. Discharge timeout done with Marlee Sheikh. All discharge needs and concerns addressed.

## 2021-10-30 NOTE — PLAN OF CARE
Patient can be discharged home from Ortho standpoint if medically cleared. Agree with Dr. Van Jones recommendations to patient. PO antibiotics. Would consider Bactrim and Clinda, since MRSA and was resistant to cefazolin. Hand soaks and packing as listed in discharge instructions. Agree recommending cessation of drug / tobacco use. Elevate left hand. Follow up with me in office in 10-14 days. [phone]. Sergio Jara.  Ayala Hartley MD  Orthopaedic Surgery and Sports Medicine   Electronically signed by Gareth Cedeno MD on 10/30/2021 at 12:24 PM

## 2021-10-30 NOTE — PROGRESS NOTES
IM Progress Note    Admit Date:  10/28/2021  2    Interval history:  Thumb pain and s.p recent I and D  Hx of Drug use     Subjective:  Mr. Apollo Rouse seen up in bed, feels well today   No pain or fevers    Wishes to go home today         Objective:   /83   Pulse 87   Temp 98.2 °F (36.8 °C) (Oral)   Resp 18   Ht 6' 1\" (1.854 m)   Wt 132 lb (59.9 kg)   SpO2 98%   BMI 17.42 kg/m²     Intake/Output Summary (Last 24 hours) at 10/30/2021 0718  Last data filed at 10/30/2021 0437  Gross per 24 hour   Intake 960 ml   Output 400 ml   Net 560 ml       Physical Exam:        General: young skinny male,  Awake, alert and oriented. Appears to be not in any distress  Mucous Membranes:  Pink , anicteric  Neck: No JVD, no carotid bruit, no thyromegaly  Chest:  Clear to auscultation bilaterally, no added sounds  Cardiovascular:  RRR S1S2 heard, no murmurs or gallops  Abdomen:  Soft, undistended, non tender, no organomegaly, BS present  Extremities: left thumb in dry dressing, wound not examined by me  See ortho notes  Large tattoos all over the body  No edema or cyanosis.  Distal pulses well felt  Neurological : grossly normal        Medications:   Scheduled Medications:    influenza virus vaccine  0.5 mL IntraMUSCular Prior to discharge    ketorolac  30 mg IntraVENous Q6H    sodium chloride flush  5-40 mL IntraVENous 2 times per day    enoxaparin  40 mg SubCUTAneous Daily    vancomycin  1,500 mg IntraVENous Q12H    nicotine  1 patch TransDERmal Daily    sodium chloride flush  5-40 mL IntraVENous 2 times per day     I   sodium chloride      sodium chloride Stopped (10/29/21 2042)     HYDROmorphone **OR** HYDROmorphone, sodium chloride flush, sodium chloride, ondansetron **OR** ondansetron, bisacodyl, oxyCODONE, hydrOXYzine, promethazine, gabapentin, polyethylene glycol, acetaminophen **OR** acetaminophen    Lab Data:  Recent Labs     10/28/21  1610 10/30/21  0457   WBC 10.8 12.7*   HGB 12.3* 12.1*   HCT 36.7* 36.7* MCV 85.9 86.3    368     Recent Labs     10/28/21  1610 10/30/21  0457    133*   K 4.3 3.5    99   CO2 24 24   BUN 11 11   CREATININE 0.6* 0.6*     No results for input(s): CKTOTAL, CKMB, CKMBINDEX, TROPONINI in the last 72 hours. Coagulation: No results found for: INR, APTT  Cardiac markers:   Lab Results   Component Value Date    CKTOTAL 182 07/13/2021    TROPONINI <0.01 07/13/2021         Lab Results   Component Value Date    ALT 13 10/28/2021    AST 16 10/28/2021    ALKPHOS 87 10/28/2021    BILITOT <0.2 10/28/2021           CULTURES  Blood: Pending   Wound cx pending     EKG:  I have reviewed the EKG with the following interpretation:   None      RADIOLOGY  XR FINGER LEFT (MIN 2 VIEWS)   Final Result   Soft tissue swelling with soft tissue gas. While that gas may be introduced   via a laceration, a gas-forming infection must be considered, including   necrotizing infections.       Questionable nondisplaced fracture of the radial aspect distal phalanx at the   interphalangeal joint.       Findings were discussed with Victoria Williamson at 4 the:39 pm on 10/28/2021.              Pertinent previous results reviewed   Surgical culture 10/20/21  Staph aureus mrsa (1)             Antibiotic Interpretation KENNEDY Status     ceFAZolin Resistant 16 mcg/mL       clindamycin Sensitive <=0.5 mcg/mL       erythromycin Resistant >4 mcg/mL       oxacillin Resistant >2 mcg/mL       tetracycline Sensitive <=4 mcg/mL       trimethoprim-sulfamethoxazole Sensitive <=0.5/9.5 mcg/mL       vancomycin Sensitive 2 mcg/mL             ASSESSMENT/PLAN:     #Left thumb abscess/felon 2/2 MRSA  #? Open fracture - questionable nondisplaced fracture of the radial aspect of the distal phalanx at the interphalangeal joint  - sp I&D at [facility] on 10/20/21, subsequently left AMA without antibiotics   - returned to ER with concern for worsening infection- swelling, pain, drainage   - XR revealed gas could be from lac, but cannot r/o gas forming infection- recent cx wiuth MRSA see above  - on IV vanco D#2,   - pain control with scheduled Toradol, PRN dilaudid, PRN acetaminophen.   His pain will be difficult to control given his substance abuse history   - ortho consulted and s.p repeat I and D of infection  And now doing better, pain improved  - can dc home with bactrim and keflex, compliance discussed     #IVDA with heroin and methamphetamine  - he uses 1/2 g of heroin daily  - he occasionally uses methamphetamine  - recommended cessation, discussed difficulties with pain control in this situation  -  c/s for rehab list      #Hepatitis C   #Hepatitis B  - per chart review  - no evidence of liver failure      #Tobacco Dependence  -Recommended cessation  - nicotine patch declined by pt      DVT Prophylaxis: Lovenox   Diet: Diet regular   Code Status: Full Code    Daisy Evans MD, 10/30/2021 7:18 AM

## 2021-10-30 NOTE — PROGRESS NOTES
Department of Orthopedic Surgery  Physician Assistant  Progress Note    Subjective:     POD 1: Left thumb repeat incision and drainage  Systemic or Specific Complaints:Pain Control    Patient tolerating p.o. Denies any issues voiding. No numbness or paresthesias. No evidence of DVT. Objective:     Patient Vitals for the past 24 hrs:   BP Temp Temp src Pulse Resp SpO2 Height Weight   10/30/21 0815 119/72 97.2 °F (36.2 °C) Oral 78 18 99 % -- --   10/30/21 0623 -- -- -- -- -- -- 6' 1\" (1.854 m) 132 lb (59.9 kg)   10/30/21 0202 126/83 98.2 °F (36.8 °C) Oral 87 18 98 % -- --   10/29/21 2030 122/76 97.5 °F (36.4 °C) Oral 101 16 99 % -- --   10/29/21 1805 117/74 99 °F (37.2 °C) Oral 98 -- 100 % -- --   10/29/21 1630 120/82 97.7 °F (36.5 °C) Oral 90 -- 100 % -- --   10/29/21 1618 (!) 141/96 -- -- 80 -- 100 % -- --   10/29/21 1552 (!) 135/98 -- -- 73 -- 99 % -- --   10/29/21 1535 130/84 -- -- 99 -- -- -- --   10/29/21 1521 128/89 -- -- 69 -- 99 % -- --   10/29/21 1517 128/89 -- -- 62 20 99 % -- --   10/29/21 1512 132/83 -- -- 76 20 99 % -- --   10/29/21 1510 127/82 98.1 °F (36.7 °C) Temporal 64 20 100 % -- --       General: alert, appears stated age and cooperative   Wound: No Erythema, No Edema and No Drainage. Packing in place. No drainage on postoperative dressing. Motion: Painful range of Motion in affected extremity   N/V Calf soft and nontender, 2+ radial pulse   DVT Exam: No evidence of DVT seen on physical exam.       Data Review  CBC:   Lab Results   Component Value Date    WBC 12.7 10/30/2021    RBC 4.26 10/30/2021    HGB 12.1 10/30/2021    HCT 36.7 10/30/2021     10/30/2021           Assessment:     POD 1: Left thumb repeat incision and drainage    Plan:     1. Continue pain control regimen  2. DVT prophylaxis  3. PT/OT as tolerated  4. Continue internal medicine recommendations  5. Switch medications to p.o. when able per primary service  6.  Soapy water soaks 3 times a day for 10 minutes  7. Wet to dry dressing        Omar Ragland PA-C    Physician Assistant - Certified  [address] [place] [address]    10/30/21 12:29 PM        This dictation was performed with a verbal recognition program [facility]) and it was checked for errors. It is possible that there are still dictated errors within this office note. If so, please bring any errors to my attention for an addendum. All efforts were made to ensure that this office note is accurate.

## 2021-10-30 NOTE — PLAN OF CARE
Problem: Pain:  Goal: Pain level will decrease  Outcome: Ongoing  Goal: Control of acute pain  Outcome: Ongoing  Goal: Control of chronic pain  Outcome: Ongoing  Goal: Patient's pain/discomfort is manageable  Outcome: Ongoing     Problem: Safety:  Goal: Free from accidental physical injury  Outcome: Ongoing  Goal: Free from intentional harm  Outcome: Ongoing     Problem: Daily Care:  Goal: Daily care needs are met  Outcome: Ongoing     Problem: Falls - Risk of:  Goal: Will remain free from falls  Outcome: Ongoing  Goal: Absence of physical injury  Outcome: Ongoing     Problem: Infection:  Goal: Will remain free from infection  Outcome: Ongoing     Problem: Skin Integrity:  Goal: Skin integrity will stabilize  Outcome: Ongoing     Problem: Discharge Planning:  Goal: Patients continuum of care needs are met  Outcome: Ongoing

## 2021-10-30 NOTE — PROGRESS NOTES
Pt found smoking in bathroom by writer. Pt stated he wanted a nicotine patch. Nicotine patch ordered. Pt c/o left hand pain 10/10. Administered dilaudid IV per order. Updated charge RN of pt smoking. Pt has telesitter in room.  Misty Esparza RN

## 2021-10-30 NOTE — PROGRESS NOTES
Pharmacy Vancomycin Consult     Vancomycin Day: 2  Current Dosin mg IVPB q12h  Current indication: thumb wound    Temp max:  99    Recent Labs     10/28/21  1610   BUN 11   CREATININE 0.6*   WBC 10.8       Intake/Output Summary (Last 24 hours) at 10/29/2021 2306  Last data filed at 10/29/2021 0217  Gross per 24 hour   Intake 735.12 ml   Output --   Net 735.12 ml     Culture Date      Source                       Results      Ht Readings from Last 1 Encounters:   10/19/21 6' 1\" (1.854 m)        Wt Readings from Last 1 Encounters:   10/28/21 132 lb (59.9 kg)       Body mass index is 17.42 kg/m². Estimated Creatinine Clearance: 161 mL/min (A) (based on SCr of 0.6 mg/dL (L)). Trough: 10.3    Assessment/Plan:  Will increase dose to 1500 mg IVPB q12h. Start now. A new vancomycin level has been ordered for 10/31 @ 1000. Expected value = 13.5    Insight summary:  Regimen: 1500 mg IV every 12 hours.   Start time: 23:00 on 10/29/2021  Exposure target: AUC24 (range)400-600 mg/L.hr   AUC24,ss: 521 mg/L.hr  Probability of AUC24 > 400: 91 %  Ctrough,ss: 13.2 mg/L  Probability of Ctrough,ss > 20: 6 %  Probability of nephrotoxicity (Lodise ZHENG ): 8 %

## 2021-10-31 NOTE — DISCHARGE SUMMARY
Name:  David Dillon  Room:  0203/0203-01  MRN:    [id number]    Discharge Summary      This discharge summary is in conjunction with a complete physical exam done on the day of discharge. Discharging Physician: Dr. Michael Sell: 10/28/2021  Discharge:  10/30/2021    HPI taken from admission H&P:    The patient is a 27 y.o. male with Hep C, Hep B, IVDA with 1/2 g of heroin daily, recreational methamphetamine use, recent admit to [facility] for left thumb/felon abscess and underwent I&D, subsequently left the hospital AMA. He returned to ER yesterday for worsening swelling of the left thumb, redness, pain, and drainage. He was admitted for further care of soft tissue infection of the right thumb. Diagnoses this Admission and Hospital Course      #Left thumb abscess  Sec to MRSA  #questionable nondisplaced fracture of the radial aspect of the distal phalanx at the interphalangeal joint   - sp I&D at [facility] on 10/20/21, subsequently left AMA without antibiotics   - returned to ER with concern for worsening infection- swelling, pain, drainage   - XR revealed gas could be from lac, but cannot r/o gas forming infection- recent cx with MRSA see above  - started on IV vanco D#2   - ortho consulted and s.p repeat I and D of infection  And now doing better, pain improved  - can dc home with bactrim and clinda, compliance discussed      #IVDA with heroin and methamphetamine  - he uses 1/2 g of heroin daily  - he occasionally uses methamphetamine  - recommended cessation, discussed difficulties with pain control in this situation  -  c/s for rehab list      #Hepatitis C   #Hepatitis B  - per chart review  - no evidence of liver failure      #Tobacco Dependence  -Recommended cessation  - nicotine patch declined by pt     Procedures (Please Review Full Report for Details)  Left thumb repeat incision and drainage.     Consults    Ortho      Physical Exam at Discharge:    /77   Pulse 89   Temp 98 °F (36.7 °C) (Oral)   Resp 18   Ht 6' 1\" (1.854 m)   Wt 132 lb (59.9 kg)   SpO2 99%   BMI 17.42 kg/m²        General: young skinny male,  Awake, alert and oriented. Appears to be not in any distress  Mucous Membranes:  Pink , anicteric  Neck: No JVD, no carotid bruit, no thyromegaly  Chest:  Clear to auscultation bilaterally, no added sounds  Cardiovascular:  RRR S1S2 heard, no murmurs or gallops  Abdomen:  Soft, undistended, non tender, no organomegaly, BS present  Extremities: left thumb in dry dressing, wound not examined by me  See ortho notes  Large tattoos all over the body  No edema or cyanosis. Distal pulses well felt  Neurological : grossly normal       CBC:   Recent Labs     10/28/21  1610 10/30/21  0457   WBC 10.8 12.7*   HGB 12.3* 12.1*   HCT 36.7* 36.7*   MCV 85.9 86.3    368     BMP:   Recent Labs     10/28/21  1610 10/30/21  0457    133*   K 4.3 3.5    99   CO2 24 24   BUN 11 11   CREATININE 0.6* 0.6*     LIVER PROFILE:   Recent Labs     10/28/21  1610   AST 16   ALT 13   BILITOT <0.2   ALKPHOS 87     PT/INR: No results for input(s): PROTIME, INR in the last 72 hours. APTT: No results for input(s): APTT in the last 72 hours. UA:No results for input(s): NITRITE, COLORU, PHUR, LABCAST, WBCUA, RBCUA, MUCUS, TRICHOMONAS, YEAST, BACTERIA, CLARITYU, SPECGRAV, LEUKOCYTESUR, UROBILINOGEN, BILIRUBINUR, BLOODU, GLUCOSEU, AMORPHOUS in the last 72 hours. Invalid input(s): KETONESU        CULTURES  Left thumb: MRSA  Blood: NGTD    RADIOLOGY  XR FINGER LEFT (MIN 2 VIEWS)   Final Result   Soft tissue swelling with soft tissue gas. While that gas may be introduced   via a laceration, a gas-forming infection must be considered, including   necrotizing infections. Questionable nondisplaced fracture of the radial aspect distal phalanx at the   interphalangeal joint. Findings were discussed with Raghav Lara at 4 the:39 pm on 10/28/2021.              Pertinent previous results reviewed   Surgical culture 10/20/21  Staph aureus mrsa (1)                   Antibiotic Interpretation KENNEDY Status     ceFAZolin Resistant 16 mcg/mL       clindamycin Sensitive <=0.5 mcg/mL       erythromycin Resistant >4 mcg/mL       oxacillin Resistant >2 mcg/mL       tetracycline Sensitive <=4 mcg/mL       trimethoprim-sulfamethoxazole Sensitive <=0.5/9.5 mcg/mL       vancomycin Sensitive 2 mcg/mL            Discharge Medications     Medication List      START taking these medications    clindamycin 300 MG capsule  Commonly known as: CLEOCIN  Take 1 capsule by mouth 3 times daily for 10 days     ketorolac 10 MG tablet  Commonly known as: TORADOL  Take 1 tablet by mouth every 6 hours as needed for Pain     sulfamethoxazole-trimethoprim 800-160 MG per tablet  Commonly known as: BACTRIM DS;SEPTRA DS  Take 1 tablet by mouth 2 times daily for 10 days     traMADol 50 MG tablet  Commonly known as: Ultram  Take 1 tablet by mouth every 6 hours as needed for Pain for up to 5 days. Intended supply: 3 days. Take lowest dose possible to manage pain           Where to Get Your Medications      You can get these medications from any pharmacy    Bring a paper prescription for each of these medications  · clindamycin 300 MG capsule  · ketorolac 10 MG tablet  · sulfamethoxazole-trimethoprim 800-160 MG per tablet  · traMADol 50 MG tablet           Discharged in stable condition to home     Follow Up:   Follow up with ortho as instructed

## 2021-11-01 LAB
BLOOD CULTURE, ROUTINE: NORMAL
CULTURE, BLOOD 2: NORMAL

## 2021-11-03 LAB
ANAEROBIC CULTURE: ABNORMAL
GRAM STAIN RESULT: ABNORMAL
ORGANISM: ABNORMAL
WOUND/ABSCESS: ABNORMAL

## 2021-11-22 LAB
FUNGUS (MYCOLOGY) CULTURE: NORMAL
FUNGUS STAIN: NORMAL

## 2021-12-07 LAB
AFB CULTURE (MYCOBACTERIA): NORMAL
AFB SMEAR: NORMAL

## 2022-02-03 ENCOUNTER — HOSPITAL ENCOUNTER (EMERGENCY)
Age: 27
Discharge: HOME | End: 2022-02-03
Attending: EMERGENCY MEDICINE
Payer: COMMERCIAL

## 2022-02-03 ENCOUNTER — APPOINTMENT (OUTPATIENT)
Dept: GENERAL RADIOLOGY | Age: 27
End: 2022-02-03
Payer: COMMERCIAL

## 2022-02-03 VITALS
DIASTOLIC BLOOD PRESSURE: 97 MMHG | OXYGEN SATURATION: 97 % | SYSTOLIC BLOOD PRESSURE: 145 MMHG | BODY MASS INDEX: 16.35 KG/M2 | HEART RATE: 74 BPM | TEMPERATURE: 98.1 F | RESPIRATION RATE: 16 BRPM | WEIGHT: 123.9 LBS

## 2022-02-03 DIAGNOSIS — L03.012 CELLULITIS OF LEFT THUMB: Primary | ICD-10-CM

## 2022-02-03 PROCEDURE — 73130 X-RAY EXAM OF HAND: CPT

## 2022-02-03 PROCEDURE — 99283 EMERGENCY DEPT VISIT LOW MDM: CPT

## 2022-02-03 PROCEDURE — 99283 EMERGENCY DEPT VISIT LOW MDM: CPT | Performed by: ORTHOPAEDIC SURGERY

## 2022-02-03 RX ORDER — CEPHALEXIN 500 MG/1
500 CAPSULE ORAL 3 TIMES DAILY
Qty: 30 CAPSULE | Refills: 0 | Status: SHIPPED | OUTPATIENT
Start: 2022-02-03 | End: 2022-02-13

## 2022-02-03 ASSESSMENT — ENCOUNTER SYMPTOMS
CHEST TIGHTNESS: 0
DIARRHEA: 0
COLOR CHANGE: 0
EYES NEGATIVE: 1
COUGH: 0
SHORTNESS OF BREATH: 0
WHEEZING: 0
ABDOMINAL PAIN: 0
SORE THROAT: 0
RHINORRHEA: 0
NAUSEA: 0
VOMITING: 0

## 2022-02-03 NOTE — CONSULTS
[facility]  Consult Note         This patient is seen in consultation at the request of Dr Gabby Pineda MD    Reason for Consult:  Left hand pain/ thumb distal phalanx chronic osteomyelitis. CHIEF COMPLAINT:  Left hand pain/ chronic infection. History Obtained From:  patient, electronic medical record    HISTORY OF PRESENT ILLNESS:   Mr. Lalo Ramirez 27 y.o.  male right handed with h/o IVDA, Hep B and C, seen today for consultation and evaluation of a left hand thumb chronic wound after an injury in Oct 2021, which occurred when he hit his left thumb with a hammer. He was seen at [facility] in Oct 2021 by Dr Peace Thompson and had 2 I&D on 10/20/2021 and 10/29/2021. He is complaining of thumb pain and swelling. This is better with elevation and worse with ROM. The pain is achy and not radiating. No other complaint. He was seen at [facility], was evaluated and I was consulted. Past Medical History:        Diagnosis Date    Asthma     Hepatitis B surface antigen positive 5/11/18, 5/1/18, 12/21/2017    Hepatitis C antibody positive in blood 5/11/18, 5/4/18, 12/21/2017    MRSA (methicillin resistant staph aureus) culture positive 10/20/2021    Hand    MRSA (methicillin resistant staph aureus) culture positive 10/29/2021    ABSCESS LEFT THUMB       Past Surgical History:        Procedure Laterality Date    HAND SURGERY Left 10/20/2021    LEFT THUMB INCISION AND DRAINAGE performed by Charis Torres MD at [address] [place] [address] HAND SURGERY Left 10/29/2021    LEFT FINGER RECURRENT INCISION AND DRAINAGE performed by Charis Torres MD at [place] [address] OTHER SURGICAL HISTORY  10/29/2021    : LEFT FINGER RECURRENT INCISION AND DRAINAGE (Left Thumb)       Medications prior to admission:   Prior to Admission medications    Medication Sig Start Date End Date Taking?  Authorizing Provider   cephALEXin (KEFLEX) 500 MG capsule Take 1 capsule by mouth 3 times daily for 10 days 2/3/22 2/13/22 Yes Rebecca Ferrara MD   ketorolac (TORADOL) 10 MG tablet Take 1 tablet by mouth every 6 hours as needed for Pain 10/30/21 10/30/22  Elias Bacon MD       Current Medications:   No current facility-administered medications for this encounter. Allergies:  Patient has no known allergies. Social History     Socioeconomic History    Marital status: Single     Spouse name: Not on file    Number of children: Not on file    Years of education: Not on file    Highest education level: Not on file   Occupational History    Not on file   Tobacco Use    Smoking status: Former Smoker     Packs/day: 0.50     Years: 5.00     Pack years: 2.50     Types: Cigarettes    Smokeless tobacco: Never Used   Vaping Use    Vaping Use: Some days    Substances: CBD    Devices: Pre-filled or refillable cartridge   Substance and Sexual Activity    Alcohol use: No     Comment: occasional    Drug use: Yes     Types: Marijuana ([place]), Opiates , IV     Comment: Clean x 2 weeks    Sexual activity: Yes     Partners: Female   Other Topics Concern    Not on file   Social History Narrative    Not on file     Social Determinants of Health     Financial Resource Strain:     Difficulty of Paying Living Expenses: Not on file   Food Insecurity:     Worried About Running Out of Food in the Last Year: Not on file    Kristin of Food in the Last Year: Not on file   Transportation Needs:     Lack of Transportation (Medical): Not on file    Lack of Transportation (Non-Medical):  Not on file   Physical Activity:     Days of Exercise per Week: Not on file    Minutes of Exercise per Session: Not on file   Stress:     Feeling of Stress : Not on file   Social Connections:     Frequency of Communication with Friends and Family: Not on file    Frequency of Social Gatherings with Friends and Family: Not on file    Attends Muslim Services: Not on file    Active Member of Clubs or Organizations: Not on file    Attends Club or Organization Meetings: Not on file    Marital Status: Not on file   Intimate Partner Violence:     Fear of Current or Ex-Partner: Not on file    Emotionally Abused: Not on file    Physically Abused: Not on file    Sexually Abused: Not on file   Housing Stability:     Unable to Pay for Housing in the Last Year: Not on file    Number of [place] in the Last Year: Not on file    Unstable Housing in the Last Year: Not on file       Family History:  History reviewed and no pertinent family history. REVIEW OF SYSTEMS:   CONSTITUTIONAL: Denies unexplained weight loss, fevers, chills or fatigue  NEUROLOGICAL: Denies unsteady gait or progressive weakness    PSYCHOLOGICAL: Denies anxiety, depression   SKIN: Denies skin changes, delayed healing, rash, itching   HEMATOLOGIC: Denies easy bleeding or bruising  ENDOCRINE: Denies excessive thirst, urination, heat/cold  RESPIRATORY: Denies current dyspnea, cough  CARDIOVASCULAR: Negative for chest pain at this time. EYES: Negative for photophobia and visual disturbance. ENT:  Negative for rhinorrhea, epistaxis, sore throat, or hearing loss. GI: Denies nausea, vomiting, diarrhea   : Denies bowel or bladder issues   MUSCULOSKELETAL: Left thumb pain. All other ROS reviewed in chart or with patient or family and are grossly negative. PHYSICAL EXAMINATION:  Mr. Danielle So is a very pleasant 27 y.o. male who seen today in no acute distress, awake, alert, and oriented. He is well nourished and groomed. Patient with normal affect. Body mass index is 16.35 kg/m². . Skin warm and dry. Resting respiratory rate is 16. Resp deep and easy.  Pulse is with regular rate and rhythm    BP (!) 145/97   Pulse 74   Temp 98.1 °F (36.7 °C) (Oral)   Resp 16   Wt 123 lb 14.4 oz (56.2 kg)   SpO2 97%   BMI 16.35 kg/m²        Airway is intact  Chest: chest clear, no wheezing, rales, normal symmetric air entry, no tachypnea, retractions or cyanosis  Heart: regular rate and rhythm ; heart sounds normal   Hearing intact, pupil equal and reactive bilateral  Lymphatics; No groin or axillary enlarged lymph nodes. Neck; No swelling  Abdomen; soft, non distended. MUSCULOSKELETAL:   Examination of the gait, showed that the patient walks with no limp. Examination of both Upper extremities showing a decreased range of motion of the left thumb compare to the other side. There is moderate swelling that can be seen, as well as chronic dry wound of the thumb distal phalanx, no erythema or drainage. He has intact sensation and good radial pulses. He has mld tenderness on deep palpation over the distal phalanx of the thumb left hand. There is a deformity of the left thumb distal phalanx. NEUROLOGIC:   Sensory:    Touch:                     Right Upper Extremity:  normal                   Left Upper Extremity:  Normal except left thumb tip. Right Lower Extremity:  normal                  Left Lower Extremity:  normal                    DATA:    CBC:   Lab Results   Component Value Date    WBC 12.7 10/30/2021    RBC 4.26 10/30/2021    HGB 12.1 10/30/2021    HCT 36.7 10/30/2021    MCV 86.3 10/30/2021    MCH 28.5 10/30/2021    MCHC 33.1 10/30/2021    RDW 12.9 10/30/2021     10/30/2021    MPV 6.6 10/30/2021     WBC:    Lab Results   Component Value Date    WBC 12.7 10/30/2021     PT/INR:  No results found for: PROTIME, INR  PTT:  No results found for: APTT[APTT    IMAGING: Xray's were reviewed, dated 2/3/2022,  3 views of the left hand, and showed a thumb distal phalanx chronic osteomyelitis. IMPRESSION: Left hand pain/ thumb distal phalanx chronic osteomyelitis. PLAN:  I discussed with Teri Feng the xray findings with chronic osteomyelitis and that we can treat this with PO ABX for now, but he will need a formal surgical I&D and partial amputation of his left thumb in the near future.  We will see him  back in 2-3 weeks at which time we will get a new xray of the left hand, and plan his surgery. Thank you very much for the kind consultation and allowing me to participate in this patient's care. I will continue to keep you apprised of his progress.         Mono Fonseca MD   2/3/2022  12:36 PM

## 2022-02-03 NOTE — ED PROVIDER NOTES
[facility]  EMERGENCY DEPARTMENTENCOUNTER      Pt Name: Flor Newsome  MRN: [id number]  [place] 1995  Date ofevaluation: 2/3/2022  Provider: Angel Gallardo MD    CHIEF COMPLAINT       Chief Complaint   Patient presents with    Hand Pain     pt wtih possible infection to left thumb. states it has been \"ongoing\". HISTORY OF PRESENT ILLNESS   (Location/Symptom, Timing/Onset,Context/Setting, Quality, Duration, Modifying Factors, Severity)  Note limiting factors. Flor Newsome is a 27 y.o. male  who  has a past medical history of Asthma, Hepatitis B surface antigen positive, Hepatitis C antibody positive in blood, MRSA (methicillin resistant staph aureus) culture positive, and MRSA (methicillin resistant staph aureus) culture positive. 51-year-old male with chronic left thumb infection who presents for acute on chronic pain in his left thumb. Patient has known MRSA infections patient has history of polysubstance abuse with IV drug abuse. Patient has hepatitis C, hepatitis B. Patient was admitted last year for the same infection and states that it is always ongoing but is here because gradually onset for the last month he has had worsening pain in the thumb. No redness no swelling no drainage. Denies fevers or chills, numbness or weakness. No other associated symptoms. Worse with palpation. Nothing else seems to make it better or worse. Risk factors IV drug abuse as above. Pain is achy and throbbing in nature. NursingNotes were reviewed. REVIEW OF SYSTEMS    (2-9 systems for level 4, 10 or more for level 5)     Review of Systems   Constitutional: Negative. Negative for fatigue and fever. HENT: Negative for congestion, rhinorrhea and sore throat. Eyes: Negative. Respiratory: Negative for cough, chest tightness, shortness of breath and wheezing. Cardiovascular: Negative for chest pain.    Gastrointestinal: Negative for abdominal pain, diarrhea, nausea and vomiting. Endocrine: Negative. Genitourinary: Negative. Musculoskeletal: Positive for arthralgias. Skin: Positive for wound. Negative for color change and rash. Allergic/Immunologic: Negative for environmental allergies and immunocompromised state. Neurological: Negative for dizziness, light-headedness and headaches. Hematological: Negative. All other systems reviewed and are negative. Except as noted above the remainder of the review of systems was reviewed and negative. PAST MEDICAL HISTORY     Past Medical History:   Diagnosis Date    Asthma     Hepatitis B surface antigen positive 5/11/18, 5/1/18, 12/21/2017    Hepatitis C antibody positive in blood 5/11/18, 5/4/18, 12/21/2017    MRSA (methicillin resistant staph aureus) culture positive 10/20/2021    Hand    MRSA (methicillin resistant staph aureus) culture positive 10/29/2021    ABSCESS LEFT THUMB         SURGICALHISTORY       Past Surgical History:   Procedure Laterality Date    HAND SURGERY Left 10/20/2021    LEFT THUMB INCISION AND DRAINAGE performed by Abhi Johnson MD at [address] [place] [address] HAND SURGERY Left 10/29/2021    LEFT FINGER RECURRENT INCISION AND DRAINAGE performed by Abhi Johnson MD at [facility] HISTORY  10/29/2021    : LEFT FINGER RECURRENT INCISION AND DRAINAGE (Left Thumb)         CURRENT MEDICATIONS       Previous Medications    KETOROLAC (TORADOL) 10 MG TABLET    Take 1 tablet by mouth every 6 hours as needed for Pain            Patient has no known allergies. FAMILY HISTORY     No family history on file.        SOCIAL HISTORY       Social History     Socioeconomic History    Marital status: Single     Spouse name: Not on file    Number of children: Not on file    Years of education: Not on file    Highest education level: Not on file   Occupational History    Not on file   Tobacco Use    Smoking status: Former Smoker Packs/day: 0.50     Years: 5.00     Pack years: 2.50     Types: Cigarettes    Smokeless tobacco: Never Used   Vaping Use    Vaping Use: Some days    Substances: CBD    Devices: Pre-filled or refillable cartridge   Substance and Sexual Activity    Alcohol use: No     Comment: occasional    Drug use: Yes     Types: Marijuana ([place]), Opiates , IV     Comment: Clean x 2 weeks    Sexual activity: Yes     Partners: Female   Other Topics Concern    Not on file   Social History Narrative    Not on file     Social Determinants of Health     Financial Resource Strain:     Difficulty of Paying Living Expenses: Not on file   Food Insecurity:     Worried About Running Out of Food in the Last Year: Not on file    Kristin of Food in the Last Year: Not on file   Transportation Needs:     Lack of Transportation (Medical): Not on file    Lack of Transportation (Non-Medical):  Not on file   Physical Activity:     Days of Exercise per Week: Not on file    Minutes of Exercise per Session: Not on file   Stress:     Feeling of Stress : Not on file   Social Connections:     Frequency of Communication with Friends and Family: Not on file    Frequency of Social Gatherings with Friends and Family: Not on file    Attends Restorationism Services: Not on file    Active Member of [address] [place] [address] Socialize or Organizations: Not on file    Attends Club or Organization Meetings: Not on file    Marital Status: Not on file   Intimate Partner Violence:     Fear of Current or Ex-Partner: Not on file    Emotionally Abused: Not on file    Physically Abused: Not on file    Sexually Abused: Not on file   Housing Stability:     Unable to Pay for Housing in the Last Year: Not on file    Number of [place] in the Last Year: Not on file    Unstable Housing in the Last Year: Not on file       SCREENINGS             PHYSICAL EXAM    (up to 7 for level 4, 8 or more for level 5)     ED Triage Vitals [02/03/22 1114]   BP Temp Temp Source Pulse Resp SpO2 Height Weight   (!) 145/97 98.1 °F (36.7 °C) Oral 74 16 97 % -- 123 lb 14.4 oz (56.2 kg)       Physical Exam  Vitals and nursing note reviewed. Constitutional:       General: He is not in acute distress. Appearance: Normal appearance. He is well-developed and normal weight. He is not ill-appearing, toxic-appearing or diaphoretic. HENT:      Head: Normocephalic and atraumatic. Mouth/Throat:      Mouth: Mucous membranes are moist.      Pharynx: Oropharynx is clear. Eyes:      Extraocular Movements: Extraocular movements intact. Cardiovascular:      Rate and Rhythm: Normal rate and regular rhythm. Pulses: Normal pulses. Pulmonary:      Effort: Pulmonary effort is normal.      Breath sounds: Normal breath sounds. No decreased breath sounds. Abdominal:      Palpations: Abdomen is soft. Tenderness: There is no abdominal tenderness. Musculoskeletal:      Right forearm: Normal.      Left forearm: Normal.      Right wrist: Normal. No swelling, deformity, effusion, lacerations, tenderness, bony tenderness, snuff box tenderness or crepitus. Normal range of motion. Normal pulse. Left wrist: Normal. No swelling, deformity, effusion, lacerations, tenderness, bony tenderness, snuff box tenderness or crepitus. Normal range of motion. Normal pulse. Right hand: No swelling, deformity, lacerations, tenderness or bony tenderness. Normal range of motion. Normal strength. Normal sensation. There is no disruption of two-point discrimination. Normal capillary refill. Normal pulse. Left hand: Tenderness and bony tenderness present. No swelling, deformity or lacerations. Normal range of motion. Normal strength. Normal sensation. There is no disruption of two-point discrimination. Normal capillary refill. Normal pulse. Cervical back: Normal range of motion and neck supple.       Comments: Left thumb with chronic appearing infection with necrosis and degradation of the skin around the left thumb with some exposed bone, no drainage, no streaking erythema, no induration, no fluctuance   Skin:     General: Skin is warm and dry. Capillary Refill: Capillary refill takes less than 2 seconds. Neurological:      General: No focal deficit present. Mental Status: He is alert. Psychiatric:         Mood and Affect: Mood normal.         RESULTS     RADIOLOGY:   Non-plain filmimages such as CT, Ultrasound and MRI are read by the radiologist.     Interpretation per the Radiologist below, if available at the time ofthis note:    XR HAND LEFT (MIN 3 VIEWS)   Final Result   Terminal 1st phalangeal osteomyelitis. ED BEDSIDE ULTRASOUND:   Performed by ED Physician - none    LABS:  Labs Reviewed - No data to display    All other labs were within normal range or not returned as of this dictation. EMERGENCY DEPARTMENT COURSE and DIFFERENTIAL DIAGNOSIS/MDM:   Vitals:    Vitals:    02/03/22 1114   BP: (!) 145/97   Pulse: 74   Resp: 16   Temp: 98.1 °F (36.7 °C)   TempSrc: Oral   SpO2: 97%   Weight: 123 lb 14.4 oz (56.2 kg)       Patient was given thefollowing medications:  Medications - No data to display    ED COURSE & MEDICAL DECISION MAKING    Pertinent Labs & Imaging studies reviewed. (See chart for details)   -  Patient seen and evaluated in the emergency department. -  Triage and nursing notes reviewed and incorporated. -  Old chart records reviewed and incorporated. -  Differential diagnosis includes: Differential diagnosis: Necrotizing fasciitis, cellulitis, erysipelas, suppurative thrombophlebitis, aseptic superficial thrombophlebitis, DVT, gout, compartment syndrome, erythema migrans (lyme disease), contact dermatitis, lymphedema, other    82-year-old male with chronic left thumb infection. Patient been seen multiple times his infection. Patient states he is frequently if not chronically on antibiotics. Patient states that he is here today in the hospital just due to pain.   No 15 minutes, excluding separately reportable procedures. There was a high probability of clinically significant/life threatening deterioration in the patient's condition which required my urgent intervention. CONSULTS:  IP CONSULT TO ORTHOPEDIC SURGERY    PROCEDURES:  Unless otherwise noted below, none     Procedures    FINAL IMPRESSION      1.  Cellulitis of left thumb          DISPOSITION/PLAN   DISPOSITION        PATIENT REFERREDTO:  Johanny oRgel MD  [address] [facility]  [phone]            DISCHARGEMEDICATIONS:  New Prescriptions    CEPHALEXIN (KEFLEX) 500 MG CAPSULE    Take 1 capsule by mouth 3 times daily for 10 days          (Please note that portions of this note were completed with a voice recognition program.  Efforts were made to edit the dictations but occasionally words are mis-transcribed.)    Alexx Alarcon MD (electronically signed)  Attending Emergency Physician         Alexx Alarcon MD  02/03/22 9261

## 2022-02-09 ENCOUNTER — TELEPHONE (OUTPATIENT)
Dept: ORTHOPEDIC SURGERY | Age: 27
End: 2022-02-09

## 2022-02-09 DIAGNOSIS — M86.9 FINGER OSTEOMYELITIS (HCC): Primary | ICD-10-CM

## 2022-02-09 DIAGNOSIS — L03.012 CELLULITIS OF LEFT THUMB: ICD-10-CM

## 2022-02-09 RX ORDER — CEPHALEXIN 500 MG/1
500 CAPSULE ORAL 4 TIMES DAILY
Qty: 40 CAPSULE | Refills: 0 | Status: SHIPPED | OUTPATIENT
Start: 2022-02-09 | End: 2022-02-19

## 2022-02-09 NOTE — TELEPHONE ENCOUNTER
Attempted to call patient back, but the call would not go through. Keflex order to be sent to pharmacy. Patient should schedule appointment now to be seen by 2-25-22 per consult note.

## 2022-02-09 NOTE — TELEPHONE ENCOUNTER
CALLED AND ATTEMPTED TO SCHEDULE PATIENT FOR A FOLLOW UP. PATIENT STATES THAT HE WOULD LIKE TO WAIT UNTIL HE FINISHES THE ANTIBIOTICS.  PATIENT ALSO ADVISED THAT HE LOST THE PAPER SCRIPT REQUESTING A NEW SCRIPT BE SENT TO [facility] IN [address] [phone]

## 2023-04-24 ENCOUNTER — HOSPITAL ENCOUNTER (EMERGENCY)
Age: 28
Discharge: HOME | End: 2023-04-24
Payer: COMMERCIAL

## 2023-04-24 VITALS
HEART RATE: 68 BPM | OXYGEN SATURATION: 100 % | HEIGHT: 72 IN | SYSTOLIC BLOOD PRESSURE: 112 MMHG | TEMPERATURE: 98 F | RESPIRATION RATE: 16 BRPM | BODY MASS INDEX: 20.05 KG/M2 | DIASTOLIC BLOOD PRESSURE: 75 MMHG | WEIGHT: 148 LBS

## 2023-04-24 DIAGNOSIS — A59.9 TRICHOMONAL INFECTION: ICD-10-CM

## 2023-04-24 DIAGNOSIS — L03.114 CELLULITIS OF LEFT UPPER EXTREMITY: Primary | ICD-10-CM

## 2023-04-24 DIAGNOSIS — F19.10 DRUG ABUSE (HCC): ICD-10-CM

## 2023-04-24 LAB
BACTERIA URNS QL MICRO: ABNORMAL /HPF
BILIRUB UR QL STRIP.AUTO: ABNORMAL
CLARITY UR: CLEAR
COLOR UR: YELLOW
CRYSTALS URNS MICRO: ABNORMAL /HPF
GLUCOSE UR STRIP.AUTO-MCNC: NEGATIVE MG/DL
HGB UR QL STRIP.AUTO: NEGATIVE
HYALINE CASTS #/AREA URNS LPF: ABNORMAL /LPF (ref 0–2)
KETONES UR STRIP.AUTO-MCNC: NEGATIVE MG/DL
LEUKOCYTE ESTERASE UR QL STRIP.AUTO: NEGATIVE
MUCOUS THREADS #/AREA URNS LPF: ABNORMAL /LPF
NITRITE UR QL STRIP.AUTO: NEGATIVE
PH UR STRIP.AUTO: 6.5 [PH] (ref 5–8)
PROT UR STRIP.AUTO-MCNC: 100 MG/DL
RBC #/AREA URNS HPF: ABNORMAL /HPF (ref 0–4)
SP GR UR STRIP.AUTO: >=1.03 (ref 1–1.03)
UA COMPLETE W REFLEX CULTURE PNL UR: ABNORMAL
UA DIPSTICK W REFLEX MICRO PNL UR: YES
URN SPEC COLLECT METH UR: ABNORMAL
UROBILINOGEN UR STRIP-ACNC: 2 E.U./DL
WBC #/AREA URNS HPF: ABNORMAL /HPF (ref 0–5)

## 2023-04-24 PROCEDURE — 81001 URINALYSIS AUTO W/SCOPE: CPT

## 2023-04-24 PROCEDURE — 99283 EMERGENCY DEPT VISIT LOW MDM: CPT

## 2023-04-24 PROCEDURE — 6370000000 HC RX 637 (ALT 250 FOR IP): Performed by: NURSE PRACTITIONER

## 2023-04-24 RX ORDER — DOXYCYCLINE HYCLATE 100 MG
100 TABLET ORAL 2 TIMES DAILY
Qty: 14 TABLET | Refills: 0 | Status: SHIPPED | OUTPATIENT
Start: 2023-04-24 | End: 2023-05-01

## 2023-04-24 RX ORDER — CEPHALEXIN 500 MG/1
500 CAPSULE ORAL 4 TIMES DAILY
Qty: 28 CAPSULE | Refills: 0 | Status: SHIPPED | OUTPATIENT
Start: 2023-04-24 | End: 2023-05-01

## 2023-04-24 RX ORDER — DOXYCYCLINE HYCLATE 100 MG
100 TABLET ORAL ONCE
Status: COMPLETED | OUTPATIENT
Start: 2023-04-24 | End: 2023-04-24

## 2023-04-24 RX ORDER — METRONIDAZOLE 250 MG/1
2000 TABLET ORAL ONCE
Status: COMPLETED | OUTPATIENT
Start: 2023-04-24 | End: 2023-04-24

## 2023-04-24 RX ADMIN — DOXYCYCLINE HYCLATE 100 MG: 100 TABLET, COATED ORAL at 22:28

## 2023-04-24 RX ADMIN — METRONIDAZOLE 2000 MG: 250 TABLET ORAL at 22:18

## 2023-04-25 NOTE — ED PROVIDER NOTES
[address] [place] [address]  ED  EMERGENCY DEPARTMENT ENCOUNTER        Pt Name: Orlando Golden  MRN: [id number]  [place] 1995  Date of evaluation: 4/24/2023  Provider: ANA Judge CNP  PCP: No primary care provider on file. Note Started: 10:16 PM EDT 4/24/23      JENNY. I have evaluated this patient. My supervising physician was available for consultation. CHIEF COMPLAINT       Chief Complaint   Patient presents with    Other     Pt to ED for c/o right hand swelling d/t drug use. Pt states he does fentanyl thru his veins and missed and since then his hand has gotten swollen. Pt also states he has Trichomoniasis and needs abx for that. HISTORY OF PRESENT ILLNESS: 1 or more Elements     History From: the patient  Limitations to history : None    Orlando Golden is a 34 y.o. male who presents to the emergency department with complaints of redness and swelling to his left hand after injecting fentanyl. Patient states that he just \"relapsed\". He states that he has not injected in his hand for the last 4 days. Patient also states that his significant other was diagnosed with trichomonas, he is not having any symptoms but states that he will need to be treated. Patient denies any fevers. He is here for further evaluation. Nursing Notes were all reviewed and agreed with or any disagreements were addressed in the HPI. REVIEW OF SYSTEMS :      Review of Systems    Positives and Pertinent negatives as per HPI.      SURGICAL HISTORY     Past Surgical History:   Procedure Laterality Date    HAND SURGERY Left 10/20/2021    LEFT THUMB INCISION AND DRAINAGE performed by Sylvia Cuadra MD at [facility] 10/29/2021    LEFT FINGER RECURRENT INCISION AND DRAINAGE performed by Sylvia Cuadra MD at [address] [place] [address]  10/29/2021    : LEFT FINGER RECURRENT INCISION AND DRAINAGE (Left Thumb)       CURRENTMEDICATIONS       Previous

## 2023-04-25 NOTE — ED NOTES
Pt right hand redness cleansed with Hibiclens/Normal Saline/red area marked with surgical marker/gauze/kerlex dressing applied. Pt instructed to keep hand clean.      Skylar Weaver LPN  96/59/08 [id number]

## 2023-04-25 NOTE — ED NOTES
Writer attempted to call pt, pt was in the bathroom at this time.       Marita Mendoza RN  04/24/23 2122

## 2023-04-25 NOTE — ED NOTES
Pt scripts x1 instructed to follow up with PCP. Assessed per Kamryn Lopez NP.      Ish Thomas, AYE  17/20/62 6652

## 2023-09-03 NOTE — FLOWSHEET NOTE
10/30/21 0815   Vital Signs   Temp 97.2 °F (36.2 °C)   Temp Source Oral   Pulse 78   Heart Rate Source Monitor   Resp 18   /72   BP Location Right upper arm   Patient Position Supine   Level of Consciousness Alert (0)   MEWS Score 1   Patient Currently in Pain Yes   Pain Assessment   Pain Assessment 0-10   Pain Level 10   Pain Type Acute pain;Surgical pain   Pain Location Finger (Comment which one)  (Thumb)   Pain Frequency Intermittent   Pain Descriptors Aching   Pain Onset Awakened from sleep   Clinical Progression Not changed   Functional Pain Assessment Activities are not prevented   Non-Pharmaceutical Pain Intervention(s) Declines   Oxygen Therapy   SpO2 99 %   O2 Device None (Room air)     Patient alert and oriented. Complains of pain Left thumb level 10 on scale 0-10, medicated as ordered. Left thumb drsg CDI. No edema noted. Able to move L hand without difficulty. No complains of numbness or tingling. Pt resting in bed quietly at this time. Denies any needs. All needs and call light within reach. Yes

## 2025-01-22 ENCOUNTER — HOSPITAL ENCOUNTER (EMERGENCY)
Age: 30
Discharge: HOME | End: 2025-01-22
Attending: EMERGENCY MEDICINE
Payer: COMMERCIAL

## 2025-01-22 ENCOUNTER — APPOINTMENT (OUTPATIENT)
Dept: CT IMAGING | Age: 30
End: 2025-01-22
Payer: COMMERCIAL

## 2025-01-22 VITALS
HEIGHT: 72 IN | RESPIRATION RATE: 16 BRPM | TEMPERATURE: 98.1 F | BODY MASS INDEX: 22.35 KG/M2 | OXYGEN SATURATION: 99 % | DIASTOLIC BLOOD PRESSURE: 81 MMHG | HEART RATE: 73 BPM | WEIGHT: 165 LBS | SYSTOLIC BLOOD PRESSURE: 120 MMHG

## 2025-01-22 DIAGNOSIS — M54.50 ACUTE BILATERAL LOW BACK PAIN WITHOUT SCIATICA: Primary | ICD-10-CM

## 2025-01-22 DIAGNOSIS — R16.2 HEPATOSPLENOMEGALY: ICD-10-CM

## 2025-01-22 LAB
ALBUMIN SERPL-MCNC: 4.2 G/DL (ref 3.4–5)
ALBUMIN/GLOB SERPL: 1.8 {RATIO} (ref 1.1–2.2)
ALP SERPL-CCNC: 104 U/L (ref 40–129)
ALT SERPL-CCNC: 13 U/L (ref 10–40)
AMPHETAMINES UR QL SCN>1000 NG/ML: ABNORMAL
ANION GAP SERPL CALCULATED.3IONS-SCNC: 11 MMOL/L (ref 3–16)
AST SERPL-CCNC: 24 U/L (ref 15–37)
BARBITURATES UR QL SCN>200 NG/ML: ABNORMAL
BASOPHILS # BLD: 0 K/UL (ref 0–0.2)
BASOPHILS NFR BLD: 0.4 %
BENZODIAZ UR QL SCN>200 NG/ML: ABNORMAL
BILIRUB SERPL-MCNC: <0.2 MG/DL (ref 0–1)
BILIRUB UR QL STRIP.AUTO: NEGATIVE
BUN SERPL-MCNC: 12 MG/DL (ref 7–20)
CALCIUM SERPL-MCNC: 8.6 MG/DL (ref 8.3–10.6)
CANNABINOIDS UR QL SCN>50 NG/ML: POSITIVE
CHLORIDE SERPL-SCNC: 103 MMOL/L (ref 99–110)
CK SERPL-CCNC: 191 U/L (ref 39–308)
CLARITY UR: CLEAR
CO2 SERPL-SCNC: 25 MMOL/L (ref 21–32)
COCAINE UR QL SCN: ABNORMAL
COLOR UR: YELLOW
CREAT SERPL-MCNC: 1 MG/DL (ref 0.9–1.3)
DEPRECATED RDW RBC AUTO: 14.2 % (ref 12.4–15.4)
DRUG SCREEN COMMENT UR-IMP: ABNORMAL
EKG ATRIAL RATE: 69 BPM
EKG DIAGNOSIS: NORMAL
EKG P AXIS: 63 DEGREES
EKG P-R INTERVAL: 176 MS
EKG Q-T INTERVAL: 386 MS
EKG QRS DURATION: 98 MS
EKG QTC CALCULATION (BAZETT): 413 MS
EKG R AXIS: 96 DEGREES
EKG T AXIS: 67 DEGREES
EKG VENTRICULAR RATE: 69 BPM
EOSINOPHIL # BLD: 0.1 K/UL (ref 0–0.6)
EOSINOPHIL NFR BLD: 2.7 %
ERYTHROCYTE [SEDIMENTATION RATE] IN BLOOD BY WESTERGREN METHOD: 3 MM/HR (ref 0–15)
FENTANYL SCREEN, URINE: ABNORMAL
GFR SERPLBLD CREATININE-BSD FMLA CKD-EPI: >90 ML/MIN/{1.73_M2}
GLUCOSE SERPL-MCNC: 79 MG/DL (ref 70–99)
GLUCOSE UR STRIP.AUTO-MCNC: NEGATIVE MG/DL
HCT VFR BLD AUTO: 37.6 % (ref 40.5–52.5)
HGB BLD-MCNC: 12.7 G/DL (ref 13.5–17.5)
HGB UR QL STRIP.AUTO: NEGATIVE
KETONES UR STRIP.AUTO-MCNC: NEGATIVE MG/DL
LACTATE BLDV-SCNC: 0.6 MMOL/L (ref 0.4–1.9)
LEUKOCYTE ESTERASE UR QL STRIP.AUTO: NEGATIVE
LYMPHOCYTES # BLD: 0.7 K/UL (ref 1–5.1)
LYMPHOCYTES NFR BLD: 15.5 %
MAGNESIUM SERPL-MCNC: 1.83 MG/DL (ref 1.8–2.4)
MCH RBC QN AUTO: 27.9 PG (ref 26–34)
MCHC RBC AUTO-ENTMCNC: 33.9 G/DL (ref 31–36)
MCV RBC AUTO: 82.4 FL (ref 80–100)
METHADONE UR QL SCN>300 NG/ML: POSITIVE
MONOCYTES # BLD: 0.4 K/UL (ref 0–1.3)
MONOCYTES NFR BLD: 8.2 %
NEUTROPHILS # BLD: 3.1 K/UL (ref 1.7–7.7)
NEUTROPHILS NFR BLD: 73.2 %
NITRITE UR QL STRIP.AUTO: NEGATIVE
OPIATES UR QL SCN>300 NG/ML: ABNORMAL
OXYCODONE UR QL SCN: ABNORMAL
PCP UR QL SCN>25 NG/ML: ABNORMAL
PH UR STRIP.AUTO: 6 [PH] (ref 5–8)
PH UR STRIP: 6 [PH]
PLATELET # BLD AUTO: 154 K/UL (ref 135–450)
PMV BLD AUTO: 7.7 FL (ref 5–10.5)
POTASSIUM SERPL-SCNC: 4.3 MMOL/L (ref 3.5–5.1)
PROT SERPL-MCNC: 6.6 G/DL (ref 6.4–8.2)
PROT UR STRIP.AUTO-MCNC: NEGATIVE MG/DL
RBC # BLD AUTO: 4.56 M/UL (ref 4.2–5.9)
SODIUM SERPL-SCNC: 139 MMOL/L (ref 136–145)
SP GR UR STRIP.AUTO: <=1.005 (ref 1–1.03)
UA COMPLETE W REFLEX CULTURE PNL UR: NORMAL
UA DIPSTICK W REFLEX MICRO PNL UR: NORMAL
URN SPEC COLLECT METH UR: NORMAL
UROBILINOGEN UR STRIP-ACNC: 0.2 E.U./DL
WBC # BLD AUTO: 4.3 K/UL (ref 4–11)

## 2025-01-22 PROCEDURE — 6360000002 HC RX W HCPCS: Performed by: PHYSICIAN ASSISTANT

## 2025-01-22 PROCEDURE — 85025 COMPLETE CBC W/AUTO DIFF WBC: CPT

## 2025-01-22 PROCEDURE — 6370000000 HC RX 637 (ALT 250 FOR IP): Performed by: EMERGENCY MEDICINE

## 2025-01-22 PROCEDURE — 85652 RBC SED RATE AUTOMATED: CPT

## 2025-01-22 PROCEDURE — 82550 ASSAY OF CK (CPK): CPT

## 2025-01-22 PROCEDURE — 99285 EMERGENCY DEPT VISIT HI MDM: CPT

## 2025-01-22 PROCEDURE — 81003 URINALYSIS AUTO W/O SCOPE: CPT

## 2025-01-22 PROCEDURE — 83735 ASSAY OF MAGNESIUM: CPT

## 2025-01-22 PROCEDURE — 80307 DRUG TEST PRSMV CHEM ANLYZR: CPT

## 2025-01-22 PROCEDURE — 96374 THER/PROPH/DIAG INJ IV PUSH: CPT

## 2025-01-22 PROCEDURE — 36415 COLL VENOUS BLD VENIPUNCTURE: CPT

## 2025-01-22 PROCEDURE — 93010 ELECTROCARDIOGRAM REPORT: CPT | Performed by: INTERNAL MEDICINE

## 2025-01-22 PROCEDURE — 93005 ELECTROCARDIOGRAM TRACING: CPT | Performed by: PHYSICIAN ASSISTANT

## 2025-01-22 PROCEDURE — 6360000004 HC RX CONTRAST MEDICATION: Performed by: PHYSICIAN ASSISTANT

## 2025-01-22 PROCEDURE — 74177 CT ABD & PELVIS W/CONTRAST: CPT

## 2025-01-22 PROCEDURE — 80053 COMPREHEN METABOLIC PANEL: CPT

## 2025-01-22 PROCEDURE — 83605 ASSAY OF LACTIC ACID: CPT

## 2025-01-22 RX ORDER — KETOROLAC TROMETHAMINE 15 MG/ML
15 INJECTION, SOLUTION INTRAMUSCULAR; INTRAVENOUS ONCE
Status: COMPLETED | OUTPATIENT
Start: 2025-01-22 | End: 2025-01-22

## 2025-01-22 RX ORDER — LIDOCAINE 4 G/G
1 PATCH TOPICAL DAILY
Qty: 10 EACH | Refills: 0 | Status: SHIPPED | OUTPATIENT
Start: 2025-01-22 | End: 2025-01-22

## 2025-01-22 RX ORDER — LIDOCAINE 4 G/G
1 PATCH TOPICAL ONCE
Status: DISCONTINUED | OUTPATIENT
Start: 2025-01-22 | End: 2025-01-22 | Stop reason: HOSPADM

## 2025-01-22 RX ORDER — LIDOCAINE 4 G/G
1 PATCH TOPICAL DAILY
Qty: 10 EACH | Refills: 0 | Status: SHIPPED | OUTPATIENT
Start: 2025-01-22 | End: 2025-02-01

## 2025-01-22 RX ORDER — ACETAMINOPHEN 325 MG/1
650 TABLET ORAL ONCE
Status: COMPLETED | OUTPATIENT
Start: 2025-01-22 | End: 2025-01-22

## 2025-01-22 RX ORDER — IOPAMIDOL 755 MG/ML
75 INJECTION, SOLUTION INTRAVASCULAR
Status: COMPLETED | OUTPATIENT
Start: 2025-01-22 | End: 2025-01-22

## 2025-01-22 RX ADMIN — IOPAMIDOL 75 ML: 755 INJECTION, SOLUTION INTRAVENOUS at 19:58

## 2025-01-22 RX ADMIN — KETOROLAC TROMETHAMINE 15 MG: 15 INJECTION, SOLUTION INTRAMUSCULAR; INTRAVENOUS at 19:37

## 2025-01-22 RX ADMIN — ACETAMINOPHEN 650 MG: 325 TABLET ORAL at 20:10

## 2025-01-22 ASSESSMENT — PAIN DESCRIPTION - LOCATION
LOCATION: FLANK
LOCATION: FLANK

## 2025-01-22 ASSESSMENT — PAIN DESCRIPTION - DESCRIPTORS: DESCRIPTORS: DISCOMFORT

## 2025-01-22 ASSESSMENT — PAIN DESCRIPTION - ORIENTATION: ORIENTATION: RIGHT;LEFT

## 2025-01-22 ASSESSMENT — PAIN - FUNCTIONAL ASSESSMENT
PAIN_FUNCTIONAL_ASSESSMENT: ACTIVITIES ARE NOT PREVENTED
PAIN_FUNCTIONAL_ASSESSMENT: 0-10

## 2025-01-22 ASSESSMENT — PAIN SCALES - GENERAL
PAINLEVEL_OUTOF10: 7
PAINLEVEL_OUTOF10: 8

## 2025-01-23 NOTE — ED PROVIDER NOTES
THIS IS MY JENNY SUPERVISORY AND SHARED VISIT NOTE:    I personally saw the patient and made/approved the management plan and take responsibility for the patient management.      I independently performed a history and physical on Hugh Gary.   All diagnostic, treatment, and disposition decisions were made by myself in conjunction with the advanced practice provider. I personally saw the patient and performed a substantive portion of the visit including all aspects of the medical decision making.      For further details of Hugh Gary's emergency department encounter, please see JOSE ALFREDO Ybarra's documentation.      History: Patient is a 29-year-old male started having low back pain and bilateral flank pain today and worse on the left side and some discomfort when urinating.  He reports prior history of drug use but has been clean for almost a year.  He denies any current abdominal pain or genital pain.  He did briefly have some discomfort that radiated towards the right thigh but denies any currently.  No reported chest pain or shortness of breath.  No pain with breathing.  No current numbness or weakness in the arms or legs.  No trouble walking.  Due to concern for significant bilateral flank pain, he came to the emergency department for further evaluation.  He feels like his urine has a \"sulfur smell\" to it.        Physical exam:   Gen: No acute distress.  Alert and answering questions appropriately.  Psych: Anxious mood and affect  HEENT: NCAT, MMM  Neck: supple, normal range of motion, nontender to palpation  Back: No midline tenderness to C/T/L-spine he does have mild paraspinal tenderness bilaterally to lower thoracic/upper lumbar spinal regions but no signs of trauma or palpable lumps noted on exam  Cardiac: RRR, pulses 2+ in extremities  Lungs: C2AB, no R/R/W  Abdomen: soft and nontender with no R/D/G  Neuro: no focal neuro deficits with strength and sensation 5/5 in all 4 extremities, normal 
before discharge, the patient is A&Ox3, Afebrile, Normotensive, in no respiratory distress, without tachycardia, symptoms have improved , thus I feel he is safe and appropriate for discharge. Discussed ED return precautions, including but not limited to, new or worsening symptoms, headache, back pain, high fevers, chest pains, shortness of breath, palpitations, abdominal pain, dizziness. I discussed the importance of post-ED visit follow-up with the patient, recommending follow up with his PCP. The patient verbalizes understanding and agrees with this plan. All patient questions have been answered.        The patient tolerated their visit well.  The patient and / or the family were informed of the results of any tests, a time was given to answer questions, a plan was proposed and they agreed with plan.    I am the Primary Clinician of Record.  FINAL IMPRESSION      1. Acute bilateral low back pain without sciatica          DISPOSITION/PLAN     DISPOSITION Decision To Discharge 01/22/2025 08:36:15 PM   DISPOSITION CONDITION Stable           PATIENT REFERRED TO:  [facility] Emergency Department  [address] [address]  [phone]  Go to   As needed, If symptoms worsen    [facility]  [address] [facility]  [address]  [facility] [address]  [phone]  Schedule an appointment as soon as possible for a visit   Follow up - Please establish care with a PCP if you do not already have one so you can follow up after your ED visit today      DISCHARGE MEDICATIONS:  Discharge Medication List as of 1/22/2025  8:48 PM        START taking these medications    Details   lidocaine 4 % external patch Place 1 patch onto the skin daily for 10 days, TransDERmal, DAILY Starting Wed 1/22/2025, Until Sat 2/1/2025, For 10 days, Disp-10 each, R-0, Normal             DISCONTINUED MEDICATIONS:  Discharge Medication List as of 1/22/2025  8:48 PM                 (Please note that portions of

## 2025-02-19 ENCOUNTER — OFFICE VISIT (OUTPATIENT)
Age: 30
End: 2025-02-19

## 2025-02-19 VITALS
TEMPERATURE: 97.6 F | OXYGEN SATURATION: 99 % | HEART RATE: 111 BPM | SYSTOLIC BLOOD PRESSURE: 122 MMHG | DIASTOLIC BLOOD PRESSURE: 76 MMHG

## 2025-02-19 DIAGNOSIS — R10.2 SUPRAPUBIC PAIN, ACUTE: ICD-10-CM

## 2025-02-19 DIAGNOSIS — R10.9 BILATERAL FLANK PAIN: Primary | ICD-10-CM

## 2025-02-19 DIAGNOSIS — R82.90 MALODOROUS URINE: ICD-10-CM

## 2025-02-19 LAB
BILIRUBIN, POC: NORMAL
BLOOD URINE, POC: NORMAL
CLARITY, POC: CLEAR
COLOR, POC: YELLOW
GLUCOSE URINE, POC: NORMAL MG/DL
KETONES, POC: NORMAL MG/DL
LEUKOCYTE EST, POC: NORMAL
NITRITE, POC: NORMAL
PH, POC: 6
PROTEIN, POC: NORMAL MG/DL
SPECIFIC GRAVITY, POC: >=1.03
UROBILINOGEN, POC: 0.2 MG/DL

## 2025-02-19 RX ORDER — METHADONE HYDROCHLORIDE 40 MG/1
140 TABLET ORAL DAILY
COMMUNITY

## 2025-02-19 RX ORDER — KETOROLAC TROMETHAMINE 30 MG/ML
15 INJECTION, SOLUTION INTRAMUSCULAR; INTRAVENOUS ONCE
Status: DISCONTINUED | OUTPATIENT
Start: 2025-02-19 | End: 2025-02-19

## 2025-02-19 RX ORDER — LIDOCAINE 50 MG/G
1 PATCH TOPICAL DAILY
Qty: 15 PATCH | Refills: 0 | Status: SHIPPED | OUTPATIENT
Start: 2025-02-19 | End: 2025-03-06

## 2025-02-19 RX ORDER — KETOROLAC TROMETHAMINE 30 MG/ML
15 INJECTION, SOLUTION INTRAMUSCULAR; INTRAVENOUS ONCE
Status: COMPLETED | OUTPATIENT
Start: 2025-02-19 | End: 2025-02-19

## 2025-02-19 RX ADMIN — KETOROLAC TROMETHAMINE 15 MG: 30 INJECTION, SOLUTION INTRAMUSCULAR; INTRAVENOUS at 20:41

## 2025-02-20 NOTE — PATIENT INSTRUCTIONS
Urine culture has been sent and will result in about 2 days. Results available on [facility] and we will call you.     Continue Lidoderm patches.     Follow-up with urology for additional evaluation. Call office and explain you were at urgent care and the ER with difficulty urinating, lower abdominal pain, and bad smelling urine. You were told to follow-up with them. They will schedule you appropriately.

## 2025-02-20 NOTE — PROGRESS NOTES
Hugh Gary (:  1995) is a 29 y.o. male,  here for evaluation of the following chief complaint(s): Back Pain (Lower back , states that pain spreads to groin and testicals , states that he was in the ER about 2 weeks ago for this )    Hugh Gary is a: New patient.   Last Urgent Care Visit: Visit date not found  I have reviewed the patient's medications and basic medical history; see Medication Reconciliation.    ASSESSMENT/PLAN:  Diagnosis:     ICD-10-CM    1. Bilateral flank pain  R10.9 Culture, Urine     POCT Urinalysis no Micro      2. Malodorous urine  R82.90       3. Suprapubic pain, acute  R10.2              Medical Decision Making:   Patient was seen at Urgent Care today for bilateral low back pain as well as urinary symptoms including malodorous urine and some hesitancy.    Symptoms have been ongoing for about 2 to 3 weeks.  He was previously seen in the ER for this and worked up.  Medical chart is reviewed including ER visit on 2025.  Labs and CT are also reviewed.  These were unremarkable.  CT showed no acute findings.  Urine was clear.  There is no evidence of kidney stone or urinary infection.    On exam today patient appears well.  He is afebrile.  There is no CVA tenderness and abdominal exam is benign.  He does report testicle pain but this is only when the pain is severe and it radiates to the testicles.  He otherwise has no testicle or scrotal symptoms.  I do not have any concern for epididymitis, orchitis.    Urine is again clear here in the clinic.  This correlates with urine in the ER on 2025.  He has no peritoneal pain and with clear urine, prostatitis is unlikely.    I do send a urine culture which will result in about 2 to 3 days.  Antibiotics can be prescribed if positive.    Otherwise, at this time the exact cause of his symptoms are unclear.  He is referred to urology for further evaluation and management.    He has given IM Toradol in the left deltoid by me and

## 2025-02-21 LAB
BACTERIA UR CULT: ABNORMAL
ORGANISM: ABNORMAL

## 2025-02-22 RX ORDER — CEPHALEXIN 500 MG/1
500 CAPSULE ORAL 2 TIMES DAILY
Qty: 10 CAPSULE | Refills: 0 | Status: SHIPPED | OUTPATIENT
Start: 2025-02-22 | End: 2025-02-27

## (undated) DEVICE — CATHETER IV 20GA L1.25IN PNK FEP SFTY STR HUB RADPQ DISP

## (undated) DEVICE — PADDING CAST W6INXL4YD NONSTERILE COT RAYON MICROPLEATED

## (undated) DEVICE — SET ADMIN PRIMING 7ML L30IN 7.35LB 20 GTT 2ND RLER CLMP

## (undated) DEVICE — 3M™ COBAN™ NL STERILE NON-LATEX SELF-ADHERENT WRAP, 2084S, 4 IN X 5 YD (10 CM X 4,5 M), 18 ROLLS/CASE: Brand: 3M™ COBAN™

## (undated) DEVICE — PACK ORTH LO EXT VI

## (undated) DEVICE — STOCKINETTE,IMPERVIOUS,12X48,STERILE: Brand: MEDLINE

## (undated) DEVICE — BANDAGE COMPR W4INXL15FT BGE E SGL LAYERED CLP CLSR

## (undated) DEVICE — GAUZE,SPONGE,4"X4",8PLY,STRL,LF,10/TRAY: Brand: MEDLINE

## (undated) DEVICE — ESMARK: Brand: DEROYAL

## (undated) DEVICE — TIP SUCT DIA12FR W STYL CTRL VENT DISPOSABLE FRAZ

## (undated) DEVICE — ELECTRODE PT RET AD L9FT HI MOIST COND ADH HYDRGEL CORDED

## (undated) DEVICE — BANDAGE,GAUZE,CONFORMING,3"X75",STRL,LF: Brand: MEDLINE

## (undated) DEVICE — GLOVE SURG SZ 65 L12IN FNGR THK79MIL GRN LTX FREE

## (undated) DEVICE — SYRINGE MED 10ML LUERLOCK TIP W/O SFTY DISP

## (undated) DEVICE — 10FR FRAZIER SUCTION HANDLE: Brand: CARDINAL HEALTH

## (undated) DEVICE — SOLUTION IV IRRIG 500ML 0.9% SODIUM CHL 2F7123

## (undated) DEVICE — PADDING CAST W4INXL4YD ST COT RAYON MICROPLEATED HIGHLY

## (undated) DEVICE — GLOVE SURG SZ 75 L12IN THK75MIL DK GRN LTX FREE

## (undated) DEVICE — NEEDLE HYPO 25GA L1.5IN BLU POLYPR HUB S STL REG BVL STR

## (undated) DEVICE — SPLINT ORTH W4XL30IN LAYERED FBRGLS FOAM PD BRTH BK MOLD

## (undated) DEVICE — GLOVE ORANGE PI 7 1/2   MSG9075

## (undated) DEVICE — PADDING UNDERCAST W4INXL4YD 100% COT CRIMPED FINISH WBRL II

## (undated) DEVICE — TRAY PREP DRY W/ PREM GLV 2 APPL 6 SPNG 2 UNDPD 1 OVERWRAP

## (undated) DEVICE — SUTURE VCRL + SZ 0 L27IN ABSRB UD L36MM CT-1 1/2 CIR VCPP41D

## (undated) DEVICE — STRIP WND PK W0.25INXL5YD IODO GZ TIGHTLY WVN CURAD

## (undated) DEVICE — SUTURE VCRL + SZ 2-0 L18IN ABSRB UD CT1 L36MM 1/2 CIR VCP839D

## (undated) DEVICE — MAJOR SET UP PK

## (undated) DEVICE — SUTURE VCRL SZ 3-0 L18IN ABSRB UD L26MM SH 1/2 CIR J864D

## (undated) DEVICE — SYRINGE,EAR/ULCER, 2 OZ, STERILE: Brand: MEDLINE

## (undated) DEVICE — PAD,ABDOMINAL,8"X10",ST,LF: Brand: MEDLINE

## (undated) DEVICE — DRESSING,GAUZE,XEROFORM,CURAD,1"X8",ST: Brand: CURAD

## (undated) DEVICE — GLOVE SURG SZ 75 L12IN FNGR THK94MIL STD WHT LTX FREE

## (undated) DEVICE — BANDAGE COMPR W2INXL5YD HI E BGE W/ CLP SURE-WRAP

## (undated) DEVICE — GLOVE SURG SZ 65 L12IN FNGR THK94MIL STD WHT LTX FREE

## (undated) DEVICE — PACK PROCEDURE SURG EXTREMITY SORGER CDS

## (undated) DEVICE — GLOVE SURG SZ 8 L12IN FNGR THK94MIL STD WHT LTX FREE

## (undated) DEVICE — GOWN,SIRUS,NON REINFRCD,LARGE,SET IN SL: Brand: MEDLINE

## (undated) DEVICE — CHLORAPREP 26ML ORANGE

## (undated) DEVICE — ZIMMER® STERILE DISPOSABLE TOURNIQUET CUFF WITH PLC, DUAL PORT, SINGLE BLADDER, 18 IN. (46 CM)